# Patient Record
Sex: FEMALE | Race: WHITE | NOT HISPANIC OR LATINO | Employment: FULL TIME | URBAN - METROPOLITAN AREA
[De-identification: names, ages, dates, MRNs, and addresses within clinical notes are randomized per-mention and may not be internally consistent; named-entity substitution may affect disease eponyms.]

---

## 2017-01-06 ENCOUNTER — TELEPHONE (OUTPATIENT)
Dept: OBSTETRICS AND GYNECOLOGY | Facility: CLINIC | Age: 27
End: 2017-01-06

## 2017-01-06 NOTE — TELEPHONE ENCOUNTER
----- Message from Blane Hamlin sent at 1/6/2017  2:11 PM CST -----  Contact: Patient  x_ 1st Request  _ 2nd Request  _ 3rd Request    Who: NENA TORRES [[id number]]    Why: Patient is returning a call to staff. Patient is needing a call back.    What Number to Call Back: Patient can be reached at [phone].    When to Expect a call back: (Before the end of the day)  -- if call after 3:00 call back will be tomorrow.

## 2017-01-06 NOTE — TELEPHONE ENCOUNTER
----- Message from Karey Crespo MA sent at 1/6/2017 12:19 PM CST -----  Contact: self      ----- Message -----     From: Lindy Echevarria     Sent: 1/6/2017   9:59 AM       To: Leah GONZALEZ Staff    Patient would like a call back to discuss possibly changing her birth control. Patient states she is still having break through bleeding since august. Patient would like the stay with oral contraceptives. Patient can be reached at [phone].

## 2017-06-01 ENCOUNTER — TELEPHONE (OUTPATIENT)
Dept: OBSTETRICS AND GYNECOLOGY | Facility: CLINIC | Age: 27
End: 2017-06-01

## 2017-06-01 NOTE — TELEPHONE ENCOUNTER
----- Message from Kenia Benitez sent at 6/1/2017  3:08 PM CDT -----  Contact: NENA TORRES [[id number]]  x_  1st Request  _  2nd Request  _  3rd Request        Who: NENA TORRES [[id number]]    Why: Patient states she would like a call in regards to switching her birth control brands    What Number to Call Back: [phone]    When to Expect a call back: (Before the end of the day)   -- if call after 3:00 call back will be tomorrow.

## 2017-06-01 NOTE — TELEPHONE ENCOUNTER
Patient complains of BTB on contraception-change pills prior and is still experiencing light spotting. Scheduled appointment for evaluation.

## 2017-07-19 RX ORDER — NORGESTREL AND ETHINYL ESTRADIOL 0.3-0.03MG
KIT ORAL
Qty: 28 TABLET | Refills: 1 | Status: SHIPPED | OUTPATIENT
Start: 2017-07-19 | End: 2017-08-16 | Stop reason: SDUPTHER

## 2017-08-16 DIAGNOSIS — Z30.8 ENCOUNTER FOR OTHER CONTRACEPTIVE MANAGEMENT: Primary | ICD-10-CM

## 2017-08-16 NOTE — TELEPHONE ENCOUNTER
Patient is scheduled for annual exam 09/18/2017- requesting additional birth control refill. Last filled 07/19/2017

## 2017-09-18 ENCOUNTER — OFFICE VISIT (OUTPATIENT)
Dept: OBSTETRICS AND GYNECOLOGY | Facility: CLINIC | Age: 27
End: 2017-09-18
Attending: OBSTETRICS & GYNECOLOGY
Payer: COMMERCIAL

## 2017-09-18 ENCOUNTER — TELEPHONE (OUTPATIENT)
Dept: OBSTETRICS AND GYNECOLOGY | Facility: CLINIC | Age: 27
End: 2017-09-18

## 2017-09-18 VITALS
SYSTOLIC BLOOD PRESSURE: 124 MMHG | DIASTOLIC BLOOD PRESSURE: 82 MMHG | BODY MASS INDEX: 28.75 KG/M2 | WEIGHT: 162.25 LBS | HEIGHT: 63 IN

## 2017-09-18 DIAGNOSIS — Z30.8 ENCOUNTER FOR OTHER CONTRACEPTIVE MANAGEMENT: ICD-10-CM

## 2017-09-18 DIAGNOSIS — N92.1 BREAKTHROUGH BLEEDING ON BIRTH CONTROL PILLS: ICD-10-CM

## 2017-09-18 DIAGNOSIS — Z01.419 WELL FEMALE EXAM WITH ROUTINE GYNECOLOGICAL EXAM: Primary | ICD-10-CM

## 2017-09-18 PROCEDURE — 99395 PREV VISIT EST AGE 18-39: CPT | Mod: S$GLB,,, | Performed by: OBSTETRICS & GYNECOLOGY

## 2017-09-18 PROCEDURE — 99999 PR PBB SHADOW E&M-EST. PATIENT-LVL III: CPT | Mod: PBBFAC,,, | Performed by: OBSTETRICS & GYNECOLOGY

## 2017-09-18 NOTE — PROGRESS NOTES
"SUBJECTIVE:   27 y.o. female   for routine gyn exam. Patient's last menstrual period was 2017 (exact date)..  She has had BTB on OCp's.  Switched from Nor BE to Trisprintec .  Then had BTB.  Switched to Lo-Ovral .  Continues with BTB.         History reviewed. No pertinent past medical history.  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     Social History Main Topics    Smoking status: Never Smoker    Smokeless tobacco: Never Used    Alcohol use Yes      Comment: occasional/pre pregnancy    Drug use: No    Sexual activity: Yes     Partners: Male     Birth control/ protection: OCP     Other Topics Concern    Not on file     Social History Narrative    No narrative on file     Family History   Problem Relation Age of Onset    Hyperlipidemia Father     Hypertension Father     Arthritis Paternal Grandmother     Asthma Paternal Grandmother     Hypertension Paternal Grandmother     Cancer Paternal Grandfather     Heart disease Paternal Grandfather     No Known Problems Mother     Cancer Maternal Grandmother     Breast cancer Neg Hx     Colon cancer Neg Hx     Eclampsia Neg Hx     Miscarriages / Stillbirths Neg Hx     Diabetes Neg Hx     Ovarian cancer Neg Hx      labor Neg Hx     Stroke Neg Hx      OB History    Para Term  AB Living   1 1 1     1   SAB TAB Ectopic Multiple Live Births         0        # Outcome Date GA Lbr Charles/2nd Weight Sex Delivery Anes PTL Lv   1 Term 16 38w4d / 01:57 2.91 kg (6 lb 6.7 oz) F Vag-Spont EPI N             Current Outpatient Prescriptions   Medication Sig Dispense Refill    ASCORBATE CALCIUM (VITAMIN C ORAL) Take by " "mouth once daily.      norethindrone-ethinyl estradiol (OVCON) 0.4-35 mg-mcg per tablet Take 1 tablet by mouth once daily. 28 tablet 11     No current facility-administered medications for this visit.      Allergies: Review of patient's allergies indicates no known allergies.     ROS:  Constitutional: no weight loss, weight gain, fever, fatigue  Eyes:  No vision changes, glasses/contacts  ENT/Mouth: No ulcers, sinus problems, ears ringing, headache  Cardiovascular: No inability to lie flat, chest pain, exercise intolerance, swelling, heart palpitations  Respiratory: No wheezing, coughing blood, shortness of breath, or cough  Gastrointestinal: No diarrhea, bloody stool, nausea/vomiting, constipation, gas, hemorrhoids  Genitourinary: No blood in urine, painful urination, urgency of urination, frequency of urination, incomplete emptying, incontinence, abnormal bleeding, painful periods, heavy periods, vaginal discharge, vaginal odor, painful intercourse, sexual problems, bleeding after intercourse.  Musculoskeletal: No muscle weakness  Skin/Breast: No painful breasts, nipple discharge, masses, rash, ulcers  Neurological: No passing out, seizures, numbness, headache  Endocrine: No diabetes, hypothyroid, hyperthyroid, hot flashes, hair loss, abnormal hair growth, ance  Psychiatric: No depression, crying  Hematologic: No bruises, bleeding, swollen lymph nodes, anemia.      OBJECTIVE:   The patient appears well, alert, oriented x 3, in no distress.  /82 (BP Method: Small (Manual))   Ht 5' 3" (1.6 m)   Wt 73.6 kg (162 lb 4.1 oz)   LMP 09/13/2017 (Exact Date)   Breastfeeding? No   BMI 28.74 kg/m²   NECK: no thyromegaly, trachea midline  SKIN: no acne, striae, hirsutism  CHEST: CTAB  CV: RRR  BREAST EXAM: breasts appear normal, no suspicious masses, no skin or nipple changes or axillary nodes  ABDOMEN: no hernias, masses, or hepatosplenomegaly  GENITALIA: normal external genitalia, no erythema, no " discharge  URETHRA: normal urethra, normal urethral meatus  VAGINA: Normal  CERVIX: no lesions or cervical motion tenderness  UTERUS: normal size, contour, position, consistency, mobility, non-tender  ADNEXA: no mass, fullness, tenderness      ASSESSMENT:   1. Well female exam with routine gynecological exam     2. Encounter for other contraceptive management     3. Breakthrough bleeding on birth control pills         PLAN:   Orders Placed This Encounter    norethindrone-ethinyl estradiol (OVCON) 0.4-35 mg-mcg per tablet     Discussed BTB and mgt options. Trial of Ovcon.  Consider u/s, stopping OCP's.  Return to clinic in 1 year

## 2017-09-18 NOTE — TELEPHONE ENCOUNTER
Patient is having a heavy menstrual cycle today-she asked if she should keep scheduled appointment. I advise if she has questions and concerns she should keep appointment.

## 2017-09-18 NOTE — TELEPHONE ENCOUNTER
----- Message from Romero Miller sent at 9/18/2017  8:15 AM CDT -----  Please call pt she has a appt @ 10:45 she has a question [phone]

## 2018-05-21 ENCOUNTER — PATIENT MESSAGE (OUTPATIENT)
Dept: OBSTETRICS AND GYNECOLOGY | Facility: CLINIC | Age: 28
End: 2018-05-21

## 2018-06-11 ENCOUNTER — OFFICE VISIT (OUTPATIENT)
Dept: OBSTETRICS AND GYNECOLOGY | Facility: CLINIC | Age: 28
End: 2018-06-11
Attending: OBSTETRICS & GYNECOLOGY
Payer: COMMERCIAL

## 2018-06-11 ENCOUNTER — LAB VISIT (OUTPATIENT)
Dept: LAB | Facility: OTHER | Age: 28
End: 2018-06-11
Attending: OBSTETRICS & GYNECOLOGY
Payer: COMMERCIAL

## 2018-06-11 VITALS
DIASTOLIC BLOOD PRESSURE: 86 MMHG | WEIGHT: 156.31 LBS | HEIGHT: 63 IN | SYSTOLIC BLOOD PRESSURE: 132 MMHG | BODY MASS INDEX: 27.7 KG/M2

## 2018-06-11 DIAGNOSIS — N93.0 POSTCOITAL BLEEDING: ICD-10-CM

## 2018-06-11 DIAGNOSIS — N92.6 MENSTRUAL DISORDER: ICD-10-CM

## 2018-06-11 DIAGNOSIS — N92.6 MENSTRUAL DISORDER: Primary | ICD-10-CM

## 2018-06-11 LAB
CANDIDA RRNA VAG QL PROBE: POSITIVE
G VAGINALIS RRNA GENITAL QL PROBE: NEGATIVE
T VAGINALIS RRNA GENITAL QL PROBE: NEGATIVE
T4 FREE SERPL-MCNC: 1.08 NG/DL
TSH SERPL DL<=0.005 MIU/L-ACNC: 1.65 UIU/ML

## 2018-06-11 PROCEDURE — 87510 GARDNER VAG DNA DIR PROBE: CPT

## 2018-06-11 PROCEDURE — 99999 PR PBB SHADOW E&M-EST. PATIENT-LVL III: CPT | Mod: PBBFAC,,, | Performed by: OBSTETRICS & GYNECOLOGY

## 2018-06-11 PROCEDURE — 87480 CANDIDA DNA DIR PROBE: CPT

## 2018-06-11 PROCEDURE — 84439 ASSAY OF FREE THYROXINE: CPT

## 2018-06-11 PROCEDURE — 36415 COLL VENOUS BLD VENIPUNCTURE: CPT

## 2018-06-11 PROCEDURE — 99213 OFFICE O/P EST LOW 20 MIN: CPT | Mod: S$GLB,,, | Performed by: OBSTETRICS & GYNECOLOGY

## 2018-06-11 PROCEDURE — 84443 ASSAY THYROID STIM HORMONE: CPT

## 2018-06-11 PROCEDURE — 87491 CHLMYD TRACH DNA AMP PROBE: CPT

## 2018-06-11 NOTE — PROGRESS NOTES
"Larissa iPerre is a 28 y.o. female patient  NEW TO ME (patient of Dr. Lynn), who presents today WITH continuous spotting as well as postcoital bleeding since 2016. She  and was on the progesterone only  Pill and had normal cycles while nursing, and was switched to the combination pills around 2016 after weaning. She began to have midcycle and irregular spotting and was switched from several different pills with no improvement. She has stopped the pills at the end of APril in order to try for pregnancy but has continued to have irregular spotting since that time. She also occasionally has had bleeding after sex.     Patient's last menstrual period was 2018.    History reviewed. No pertinent past medical history.  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     Social History Main Topics    Smoking status: Never Smoker    Smokeless tobacco: Never Used    Alcohol use Yes      Comment: occasional/pre pregnancy    Drug use: No    Sexual activity: Yes     Partners: Male     Birth control/ protection: None      Comment:      Other Topics Concern    Not on file     Social History Narrative    No narrative on file     Family History   Problem Relation Age of Onset    Hyperlipidemia Father     Hypertension Father     Arthritis Paternal Grandmother     Asthma Paternal Grandmother     Hypertension Paternal Grandmother     Cancer Paternal Grandfather     Heart disease Paternal Grandfather     No Known Problems Mother     Cancer Maternal Grandmother     Breast cancer Neg Hx     Colon cancer Neg Hx     Eclampsia Neg Hx     Miscarriages / Stillbirths Neg Hx     " "Diabetes Neg Hx     Ovarian cancer Neg Hx      labor Neg Hx     Stroke Neg Hx      OB History      Para Term  AB Living    1 1 1     1    SAB TAB Ectopic Multiple Live Births          0                  ROS:  GENERAL: Feeling well overall.   SKIN: Denies rash or lesions.   HEAD: Denies head injury or headache.   NODES: Denies enlarged lymph nodes.   CHEST: Denies chest pain or shortness of breath.   CARDIOVASCULAR: Denies palpitations or left sided chest pain.   ABDOMEN: No abdominal pain, nausea, vomiting or rectal bleeding.   URINARY: No dysuria, hematuria, or burning on urination.  REPRODUCTIVE: See HPI.   BREASTS: Denies pain, lumps, or nipple discharge.   HEMATOLOGIC: No easy bruisability or excessive bleeding.   MUSCULOSKELETAL: Denies joint pain or swelling.   NEUROLOGIC: Denies syncope or weakness.   PSYCHIATRIC: Denies depression.    /86   Ht 5' 3" (1.6 m)   Wt 70.9 kg (156 lb 4.9 oz)   LMP 2018   BMI 27.69 kg/m²     PE:   APPEARANCE: Well nourished, well developed, in no acute distress.  ABDOMEN: Soft. No tenderness or masses. No hernias. No CVA tenderness.  VULVA: No lesions. Normal female genitalia.  URETHRAL MEATUS: Normal size and location, no lesions, no prolapse.  URETHRA: No masses, tenderness, prolapse or scarring.  VAGINA: Moist and well rugated, no discharge, no significant cystocele or rectocele.  CERVIX: No lesions and discharge. FRIABLE, with ECTROPION.   UTERUS: Normal size, regular shape, mobile, non-tender, bladder base nontender.  ADNEXA: No masses, tenderness or CDS nodularity.  ANUS PERINEUM: Normal.    PROCEDURES:    1. Menstrual disorder  US Pelvis Comp with Transvag NON-OB (xpd    TSH    T4, free   2. Postcoital bleeding  C. trachomatis/N. gonorrhoeae by AMP DNA Cervix    Vaginosis Screen by DNA Probe    AND PLAN:  Discussed evaluation for irregular bleeding, including the possible need for hysteroscopy. Face to face time with this patient was " 20 minutes of which more than 50% was spent in counseling.    Follow-up if symptoms worsen or fail to improve.

## 2018-06-12 ENCOUNTER — HOSPITAL ENCOUNTER (OUTPATIENT)
Dept: RADIOLOGY | Facility: OTHER | Age: 28
Discharge: HOME | End: 2018-06-12
Attending: OBSTETRICS & GYNECOLOGY
Payer: COMMERCIAL

## 2018-06-12 DIAGNOSIS — N92.6 MENSTRUAL DISORDER: ICD-10-CM

## 2018-06-12 DIAGNOSIS — B37.9 MONILIA INFECTION: Primary | ICD-10-CM

## 2018-06-12 LAB
C TRACH DNA SPEC QL NAA+PROBE: NOT DETECTED
N GONORRHOEA DNA SPEC QL NAA+PROBE: NOT DETECTED

## 2018-06-12 PROCEDURE — 76856 US EXAM PELVIC COMPLETE: CPT | Mod: 26,,, | Performed by: RADIOLOGY

## 2018-06-12 PROCEDURE — 76830 TRANSVAGINAL US NON-OB: CPT | Mod: 26,,, | Performed by: RADIOLOGY

## 2018-06-12 PROCEDURE — 76830 TRANSVAGINAL US NON-OB: CPT | Mod: TC

## 2018-06-12 RX ORDER — FLUCONAZOLE 150 MG/1
150 TABLET ORAL ONCE
Qty: 1 TABLET | Refills: 0 | Status: SHIPPED | OUTPATIENT
Start: 2018-06-12 | End: 2018-06-12

## 2018-06-13 ENCOUNTER — TELEPHONE (OUTPATIENT)
Dept: OBSTETRICS AND GYNECOLOGY | Facility: CLINIC | Age: 28
End: 2018-06-13

## 2018-06-13 ENCOUNTER — PATIENT MESSAGE (OUTPATIENT)
Dept: OBSTETRICS AND GYNECOLOGY | Facility: CLINIC | Age: 28
End: 2018-06-13

## 2018-06-13 NOTE — TELEPHONE ENCOUNTER
Pt want to talk about options for bleeding. Advised will have Dr Meléndez call her once she is done clinic

## 2018-06-13 NOTE — TELEPHONE ENCOUNTER
----- Message from Emeli Diaz sent at 6/13/2018  8:35 AM CDT -----  Contact: self  Pt returning a missed call from yesterday, she can be reached at [phone].

## 2018-06-14 DIAGNOSIS — N92.6 MENSTRUAL DISORDER: Primary | ICD-10-CM

## 2018-06-14 NOTE — TELEPHONE ENCOUNTER
"Discussed with patient that she is probably not ovulating, but will check labs for further verification. Will consider cycling with Prometrium to "reset" cycle.   "

## 2018-06-18 ENCOUNTER — TELEPHONE (OUTPATIENT)
Dept: OBSTETRICS AND GYNECOLOGY | Facility: CLINIC | Age: 28
End: 2018-06-18

## 2018-06-18 ENCOUNTER — LAB VISIT (OUTPATIENT)
Dept: LAB | Facility: HOSPITAL | Age: 28
End: 2018-06-18
Attending: OBSTETRICS & GYNECOLOGY
Payer: COMMERCIAL

## 2018-06-18 ENCOUNTER — PATIENT MESSAGE (OUTPATIENT)
Dept: OBSTETRICS AND GYNECOLOGY | Facility: CLINIC | Age: 28
End: 2018-06-18

## 2018-06-18 DIAGNOSIS — N92.6 MENSTRUAL DISORDER: ICD-10-CM

## 2018-06-18 DIAGNOSIS — N91.2 AMENORRHEA: ICD-10-CM

## 2018-06-18 DIAGNOSIS — N91.2 AMENORRHEA: Primary | ICD-10-CM

## 2018-06-18 LAB — HCG INTACT+B SERPL-ACNC: 595 MIU/ML

## 2018-06-18 PROCEDURE — 82670 ASSAY OF TOTAL ESTRADIOL: CPT

## 2018-06-18 PROCEDURE — 36415 COLL VENOUS BLD VENIPUNCTURE: CPT

## 2018-06-18 PROCEDURE — 84144 ASSAY OF PROGESTERONE: CPT

## 2018-06-18 PROCEDURE — 83001 ASSAY OF GONADOTROPIN (FSH): CPT

## 2018-06-18 PROCEDURE — 84702 CHORIONIC GONADOTROPIN TEST: CPT

## 2018-06-18 PROCEDURE — 84146 ASSAY OF PROLACTIN: CPT

## 2018-06-19 ENCOUNTER — PATIENT MESSAGE (OUTPATIENT)
Dept: OBSTETRICS AND GYNECOLOGY | Facility: CLINIC | Age: 28
End: 2018-06-19

## 2018-06-19 DIAGNOSIS — Z32.01 POSITIVE PREGNANCY TEST: Primary | ICD-10-CM

## 2018-06-19 LAB
ESTRADIOL SERPL-MCNC: 343 PG/ML
FSH SERPL-ACNC: 0.1 MIU/ML
PROGEST SERPL-MCNC: 27.3 NG/ML
PROLACTIN SERPL IA-MCNC: 12.3 NG/ML

## 2018-06-20 ENCOUNTER — LAB VISIT (OUTPATIENT)
Dept: LAB | Facility: HOSPITAL | Age: 28
End: 2018-06-20
Attending: OBSTETRICS & GYNECOLOGY
Payer: COMMERCIAL

## 2018-06-20 DIAGNOSIS — Z32.01 POSITIVE PREGNANCY TEST: ICD-10-CM

## 2018-06-20 LAB
ABO + RH BLD: NORMAL
BASOPHILS # BLD AUTO: 0.01 K/UL
BASOPHILS NFR BLD: 0.2 %
BLD GP AB SCN CELLS X3 SERPL QL: NORMAL
DIFFERENTIAL METHOD: NORMAL
EOSINOPHIL # BLD AUTO: 0 K/UL
EOSINOPHIL NFR BLD: 0.9 %
ERYTHROCYTE [DISTWIDTH] IN BLOOD BY AUTOMATED COUNT: 13.2 %
HCG INTACT+B SERPL-ACNC: 1206 MIU/ML
HCT VFR BLD AUTO: 38.9 %
HGB BLD-MCNC: 13.5 G/DL
LYMPHOCYTES # BLD AUTO: 1.2 K/UL
LYMPHOCYTES NFR BLD: 26.3 %
MCH RBC QN AUTO: 29.9 PG
MCHC RBC AUTO-ENTMCNC: 34.7 G/DL
MCV RBC AUTO: 86 FL
MONOCYTES # BLD AUTO: 0.4 K/UL
MONOCYTES NFR BLD: 7.9 %
NEUTROPHILS # BLD AUTO: 3 K/UL
NEUTROPHILS NFR BLD: 64.3 %
PLATELET # BLD AUTO: 229 K/UL
PMV BLD AUTO: 10.1 FL
PROGEST SERPL-MCNC: 21.9 NG/ML
RBC # BLD AUTO: 4.52 M/UL
WBC # BLD AUTO: 4.68 K/UL

## 2018-06-20 PROCEDURE — 87340 HEPATITIS B SURFACE AG IA: CPT

## 2018-06-20 PROCEDURE — 86592 SYPHILIS TEST NON-TREP QUAL: CPT

## 2018-06-20 PROCEDURE — 86850 RBC ANTIBODY SCREEN: CPT

## 2018-06-20 PROCEDURE — 84144 ASSAY OF PROGESTERONE: CPT

## 2018-06-20 PROCEDURE — 86703 HIV-1/HIV-2 1 RESULT ANTBDY: CPT

## 2018-06-20 PROCEDURE — 86762 RUBELLA ANTIBODY: CPT

## 2018-06-20 PROCEDURE — 84702 CHORIONIC GONADOTROPIN TEST: CPT

## 2018-06-20 PROCEDURE — 36415 COLL VENOUS BLD VENIPUNCTURE: CPT

## 2018-06-20 PROCEDURE — 85025 COMPLETE CBC W/AUTO DIFF WBC: CPT

## 2018-06-21 LAB
HBV SURFACE AG SERPL QL IA: NEGATIVE
HIV 1+2 AB+HIV1 P24 AG SERPL QL IA: NEGATIVE
RPR SER QL: NORMAL
RUBV IGG SER-ACNC: 11.1 IU/ML
RUBV IGG SER-IMP: REACTIVE

## 2018-07-10 ENCOUNTER — PROCEDURE VISIT (OUTPATIENT)
Dept: OBSTETRICS AND GYNECOLOGY | Facility: CLINIC | Age: 28
End: 2018-07-10
Attending: OBSTETRICS & GYNECOLOGY
Payer: COMMERCIAL

## 2018-07-10 DIAGNOSIS — N92.6 MISSED MENSES: ICD-10-CM

## 2018-07-10 DIAGNOSIS — Z36.89 ESTABLISH GESTATIONAL AGE, ULTRASOUND: ICD-10-CM

## 2018-07-10 DIAGNOSIS — O36.80X0 ENCOUNTER TO DETERMINE FETAL VIABILITY OF PREGNANCY, SINGLE OR UNSPECIFIED FETUS: ICD-10-CM

## 2018-07-10 DIAGNOSIS — Z32.01 POSITIVE PREGNANCY TEST: ICD-10-CM

## 2018-07-10 PROCEDURE — 76801 OB US < 14 WKS SINGLE FETUS: CPT | Mod: S$GLB,,, | Performed by: OBSTETRICS & GYNECOLOGY

## 2018-07-10 NOTE — PROCEDURES
Procedures   Obstetrical ultrasound completed today.  See report in imaging section of [facility].

## 2018-07-18 ENCOUNTER — TELEPHONE (OUTPATIENT)
Dept: OBSTETRICS AND GYNECOLOGY | Facility: CLINIC | Age: 28
End: 2018-07-18

## 2018-07-18 NOTE — TELEPHONE ENCOUNTER
----- Message from Trisha Andre sent at 7/18/2018  9:17 AM CDT -----  Contact: pt            Name of Who is Calling:NENA TORRES [[id number]]    What is the request in detail: pt is 9 weeks and would like to transfer care from Dr Meléndez. Pt has had first ultra sound and blood work.. Please advise      Can the clinic reply by [facility]: no      [facility]: [phone]

## 2018-07-19 ENCOUNTER — TELEPHONE (OUTPATIENT)
Dept: OBSTETRICS AND GYNECOLOGY | Facility: CLINIC | Age: 28
End: 2018-07-19

## 2018-07-19 NOTE — TELEPHONE ENCOUNTER
----- Message from Kenia Nino sent at 7/19/2018  9:17 AM CDT -----  Contact: NENA TORRES [[id number]]  Name of Who is Calling:NENA TORRES [[id number]]     What is the request in detail: Returning a call.  pt is 9 weeks and would like to transfer care from Dr Meléndez. Pt has had first ultra sound and blood work.. Please advise        Can the clinic reply by [facility]: no        What [facility]: [phone]

## 2018-07-23 ENCOUNTER — TELEPHONE (OUTPATIENT)
Dept: OBSTETRICS AND GYNECOLOGY | Facility: CLINIC | Age: 28
End: 2018-07-23

## 2018-07-23 DIAGNOSIS — O21.9 NAUSEA/VOMITING IN PREGNANCY: Primary | ICD-10-CM

## 2018-07-23 RX ORDER — ONDANSETRON 8 MG/1
8 TABLET, ORALLY DISINTEGRATING ORAL EVERY 8 HOURS PRN
Qty: 30 TABLET | Refills: 0 | Status: SHIPPED | OUTPATIENT
Start: 2018-07-23 | End: 2019-02-07 | Stop reason: SDUPTHER

## 2018-07-23 NOTE — TELEPHONE ENCOUNTER
----- Message from Lindy Echevarria sent at 7/23/2018  8:36 AM CDT -----  Contact: self  Patient is requesting a Rx for nausea. Patient uses [facility]. Patient can be reached at [phone].

## 2018-07-24 ENCOUNTER — PATIENT MESSAGE (OUTPATIENT)
Dept: OBSTETRICS AND GYNECOLOGY | Facility: CLINIC | Age: 28
End: 2018-07-24

## 2018-08-14 ENCOUNTER — PATIENT OUTREACH (OUTPATIENT)
Dept: OTHER | Facility: OTHER | Age: 28
End: 2018-08-14

## 2018-08-14 ENCOUNTER — PATIENT MESSAGE (OUTPATIENT)
Dept: ADMINISTRATIVE | Facility: OTHER | Age: 28
End: 2018-08-14

## 2018-08-14 ENCOUNTER — ROUTINE PRENATAL (OUTPATIENT)
Dept: OBSTETRICS AND GYNECOLOGY | Facility: CLINIC | Age: 28
End: 2018-08-14
Payer: COMMERCIAL

## 2018-08-14 VITALS
DIASTOLIC BLOOD PRESSURE: 70 MMHG | BODY MASS INDEX: 29.09 KG/M2 | WEIGHT: 164.25 LBS | SYSTOLIC BLOOD PRESSURE: 122 MMHG

## 2018-08-14 DIAGNOSIS — Z34.80 SUPERVISION OF OTHER NORMAL PREGNANCY, ANTEPARTUM: Primary | ICD-10-CM

## 2018-08-14 PROCEDURE — 0500F INITIAL PRENATAL CARE VISIT: CPT | Mod: S$GLB,,, | Performed by: OBSTETRICS & GYNECOLOGY

## 2018-08-14 PROCEDURE — 99999 PR PBB SHADOW E&M-EST. PATIENT-LVL II: CPT | Mod: PBBFAC,,, | Performed by: OBSTETRICS & GYNECOLOGY

## 2018-08-17 NOTE — TELEPHONE ENCOUNTER
Initial introduction with Ms. Larissa White Ignacio completed and the role of the health coaches for [facility] was explained via [employer].    Reviewed the importance of taking weights and blood pressure readings, using the health  as a resource for physical activity and dietary habits, and that [facility] messages will be sent for weeks 20, 30, and 37 home urine tests.  Reviewed that the patient should contact her OB team with any specific questions regarding her pregnancy, and to contact [facility] or 911 in the case of a medical emergency.

## 2018-09-09 ENCOUNTER — PATIENT MESSAGE (OUTPATIENT)
Dept: OBSTETRICS AND GYNECOLOGY | Facility: CLINIC | Age: 28
End: 2018-09-09

## 2018-09-13 ENCOUNTER — ROUTINE PRENATAL (OUTPATIENT)
Dept: OBSTETRICS AND GYNECOLOGY | Facility: CLINIC | Age: 28
End: 2018-09-13
Payer: COMMERCIAL

## 2018-09-13 VITALS
BODY MASS INDEX: 29.84 KG/M2 | WEIGHT: 168.44 LBS | SYSTOLIC BLOOD PRESSURE: 108 MMHG | DIASTOLIC BLOOD PRESSURE: 64 MMHG

## 2018-09-13 DIAGNOSIS — Z34.80 SUPERVISION OF OTHER NORMAL PREGNANCY, ANTEPARTUM: Primary | ICD-10-CM

## 2018-09-13 PROCEDURE — 99999 PR PBB SHADOW E&M-EST. PATIENT-LVL III: CPT | Mod: PBBFAC,,, | Performed by: OBSTETRICS & GYNECOLOGY

## 2018-09-13 PROCEDURE — 0502F SUBSEQUENT PRENATAL CARE: CPT | Mod: S$GLB,,, | Performed by: OBSTETRICS & GYNECOLOGY

## 2018-09-13 RX ORDER — AZELAIC ACID 0.15 G/G
AEROSOL, FOAM TOPICAL
Status: ON HOLD | COMMUNITY
Start: 2018-09-10 | End: 2022-10-24 | Stop reason: HOSPADM

## 2018-09-13 RX ORDER — CLINDAMYCIN 1 G/10ML
GEL TOPICAL
COMMUNITY
Start: 2018-09-10 | End: 2022-10-20 | Stop reason: CLARIF

## 2018-09-13 NOTE — PROGRESS NOTES
Pt doing well. No vaginal bleeding, no loss of fluid, positive fetal movement, no contractions. Bleeding/labor/rom/fmc precautions.     [facility]. RTC 8 weeks. DM screen, CBC next visit. Flu shot on 10/1/18 with anatomy.

## 2018-10-01 ENCOUNTER — CLINICAL SUPPORT (OUTPATIENT)
Dept: OBSTETRICS AND GYNECOLOGY | Facility: CLINIC | Age: 28
End: 2018-10-01
Payer: COMMERCIAL

## 2018-10-01 ENCOUNTER — PROCEDURE VISIT (OUTPATIENT)
Dept: MATERNAL FETAL MEDICINE | Facility: CLINIC | Age: 28
End: 2018-10-01
Payer: COMMERCIAL

## 2018-10-01 DIAGNOSIS — Z34.80 SUPERVISION OF OTHER NORMAL PREGNANCY, ANTEPARTUM: ICD-10-CM

## 2018-10-01 DIAGNOSIS — Z36.89 ENCOUNTER FOR FETAL ANATOMIC SURVEY: ICD-10-CM

## 2018-10-01 PROCEDURE — 90686 IIV4 VACC NO PRSV 0.5 ML IM: CPT | Mod: S$GLB,,, | Performed by: OBSTETRICS & GYNECOLOGY

## 2018-10-01 PROCEDURE — 99999 PR PBB SHADOW E&M-EST. PATIENT-LVL II: CPT | Mod: PBBFAC,,,

## 2018-10-01 PROCEDURE — 76805 OB US >/= 14 WKS SNGL FETUS: CPT | Mod: S$GLB,,, | Performed by: OBSTETRICS & GYNECOLOGY

## 2018-10-01 PROCEDURE — 99499 UNLISTED E&M SERVICE: CPT | Mod: S$GLB,,, | Performed by: OBSTETRICS & GYNECOLOGY

## 2018-10-01 PROCEDURE — 90471 IMMUNIZATION ADMIN: CPT | Mod: S$GLB,,, | Performed by: OBSTETRICS & GYNECOLOGY

## 2018-10-01 NOTE — PROGRESS NOTES
Obstetrical ultrasound completed today.  See report in imaging section of [facility].

## 2018-10-10 ENCOUNTER — PATIENT OUTREACH (OUTPATIENT)
Dept: OTHER | Facility: OTHER | Age: 28
End: 2018-10-10

## 2018-10-10 NOTE — TELEPHONE ENCOUNTER
Reminded patient that week 20 home urine test is due for [employer] via [employer]. Asked that she check [facility] messages for instructions and link to submit results.

## 2018-10-11 ENCOUNTER — PATIENT MESSAGE (OUTPATIENT)
Dept: ADMINISTRATIVE | Facility: OTHER | Age: 28
End: 2018-10-11

## 2018-11-08 ENCOUNTER — LAB VISIT (OUTPATIENT)
Dept: LAB | Facility: OTHER | Age: 28
End: 2018-11-08
Attending: OBSTETRICS & GYNECOLOGY
Payer: COMMERCIAL

## 2018-11-08 ENCOUNTER — ROUTINE PRENATAL (OUTPATIENT)
Dept: OBSTETRICS AND GYNECOLOGY | Facility: CLINIC | Age: 28
End: 2018-11-08
Payer: COMMERCIAL

## 2018-11-08 VITALS
WEIGHT: 179.88 LBS | DIASTOLIC BLOOD PRESSURE: 82 MMHG | BODY MASS INDEX: 31.87 KG/M2 | SYSTOLIC BLOOD PRESSURE: 114 MMHG

## 2018-11-08 DIAGNOSIS — Z34.80 SUPERVISION OF OTHER NORMAL PREGNANCY, ANTEPARTUM: ICD-10-CM

## 2018-11-08 DIAGNOSIS — Z34.80 SUPERVISION OF OTHER NORMAL PREGNANCY, ANTEPARTUM: Primary | ICD-10-CM

## 2018-11-08 LAB
BASOPHILS # BLD AUTO: 0.01 K/UL
BASOPHILS NFR BLD: 0.2 %
DIFFERENTIAL METHOD: ABNORMAL
EOSINOPHIL # BLD AUTO: 0.1 K/UL
EOSINOPHIL NFR BLD: 0.8 %
ERYTHROCYTE [DISTWIDTH] IN BLOOD BY AUTOMATED COUNT: 12.8 %
GLUCOSE SERPL-MCNC: 80 MG/DL
HCT VFR BLD AUTO: 35.9 %
HGB BLD-MCNC: 11.9 G/DL
LYMPHOCYTES # BLD AUTO: 1.5 K/UL
LYMPHOCYTES NFR BLD: 23.9 %
MCH RBC QN AUTO: 30.2 PG
MCHC RBC AUTO-ENTMCNC: 33.1 G/DL
MCV RBC AUTO: 91 FL
MONOCYTES # BLD AUTO: 0.4 K/UL
MONOCYTES NFR BLD: 7.2 %
NEUTROPHILS # BLD AUTO: 4.1 K/UL
NEUTROPHILS NFR BLD: 67.2 %
PLATELET # BLD AUTO: 221 K/UL
PMV BLD AUTO: 10.1 FL
RBC # BLD AUTO: 3.94 M/UL
WBC # BLD AUTO: 6.12 K/UL

## 2018-11-08 PROCEDURE — 99999 PR PBB SHADOW E&M-EST. PATIENT-LVL III: CPT | Mod: PBBFAC,,, | Performed by: OBSTETRICS & GYNECOLOGY

## 2018-11-08 PROCEDURE — 82950 GLUCOSE TEST: CPT

## 2018-11-08 PROCEDURE — 0502F SUBSEQUENT PRENATAL CARE: CPT | Mod: S$GLB,,, | Performed by: OBSTETRICS & GYNECOLOGY

## 2018-11-08 PROCEDURE — 36415 COLL VENOUS BLD VENIPUNCTURE: CPT

## 2018-11-08 PROCEDURE — 85025 COMPLETE CBC W/AUTO DIFF WBC: CPT

## 2018-11-08 NOTE — PROGRESS NOTES
Pt doing well. No vaginal bleeding, no loss of fluid, positive fetal movement, no contractions. Bleeding/labor/rom/fmc precautions.     DM screen done today. Tdap next visit. RTC 4 weeks.

## 2018-11-14 ENCOUNTER — PATIENT MESSAGE (OUTPATIENT)
Dept: OBSTETRICS AND GYNECOLOGY | Facility: CLINIC | Age: 28
End: 2018-11-14

## 2018-12-06 ENCOUNTER — ROUTINE PRENATAL (OUTPATIENT)
Dept: OBSTETRICS AND GYNECOLOGY | Facility: CLINIC | Age: 28
End: 2018-12-06
Payer: COMMERCIAL

## 2018-12-06 ENCOUNTER — CLINICAL SUPPORT (OUTPATIENT)
Dept: OBSTETRICS AND GYNECOLOGY | Facility: CLINIC | Age: 28
End: 2018-12-06
Payer: COMMERCIAL

## 2018-12-06 VITALS
BODY MASS INDEX: 32.84 KG/M2 | DIASTOLIC BLOOD PRESSURE: 70 MMHG | SYSTOLIC BLOOD PRESSURE: 102 MMHG | WEIGHT: 185.44 LBS

## 2018-12-06 DIAGNOSIS — Z34.80 SUPERVISION OF OTHER NORMAL PREGNANCY, ANTEPARTUM: ICD-10-CM

## 2018-12-06 DIAGNOSIS — Z34.80 SUPERVISION OF OTHER NORMAL PREGNANCY, ANTEPARTUM: Primary | ICD-10-CM

## 2018-12-06 PROCEDURE — 99999 PR PBB SHADOW E&M-EST. PATIENT-LVL II: CPT | Mod: PBBFAC,,,

## 2018-12-06 PROCEDURE — 0502F SUBSEQUENT PRENATAL CARE: CPT | Mod: S$GLB,,, | Performed by: OBSTETRICS & GYNECOLOGY

## 2018-12-06 PROCEDURE — 99999 PR PBB SHADOW E&M-EST. PATIENT-LVL II: CPT | Mod: PBBFAC,,, | Performed by: OBSTETRICS & GYNECOLOGY

## 2018-12-06 PROCEDURE — 90715 TDAP VACCINE 7 YRS/> IM: CPT | Mod: S$GLB,,, | Performed by: OBSTETRICS & GYNECOLOGY

## 2018-12-06 PROCEDURE — 90471 IMMUNIZATION ADMIN: CPT | Mod: S$GLB,,, | Performed by: OBSTETRICS & GYNECOLOGY

## 2018-12-06 NOTE — PROGRESS NOTES
Pt doing well. No vaginal bleeding, no loss of fluid, positive fetal movement, no contractions. Bleeding/labor/rom/fmc precautions.     Tdap today. Next visit [facility]. RTC 1/3.

## 2018-12-19 ENCOUNTER — PATIENT MESSAGE (OUTPATIENT)
Dept: OBSTETRICS AND GYNECOLOGY | Facility: CLINIC | Age: 28
End: 2018-12-19

## 2018-12-19 ENCOUNTER — PATIENT OUTREACH (OUTPATIENT)
Dept: OTHER | Facility: OTHER | Age: 28
End: 2018-12-19

## 2019-01-02 ENCOUNTER — PATIENT MESSAGE (OUTPATIENT)
Dept: ADMINISTRATIVE | Facility: OTHER | Age: 29
End: 2019-01-02

## 2019-01-03 ENCOUNTER — ROUTINE PRENATAL (OUTPATIENT)
Dept: OBSTETRICS AND GYNECOLOGY | Facility: CLINIC | Age: 29
End: 2019-01-03
Payer: COMMERCIAL

## 2019-01-03 VITALS
WEIGHT: 190.06 LBS | SYSTOLIC BLOOD PRESSURE: 104 MMHG | DIASTOLIC BLOOD PRESSURE: 70 MMHG | BODY MASS INDEX: 33.66 KG/M2

## 2019-01-03 DIAGNOSIS — Z34.80 SUPERVISION OF OTHER NORMAL PREGNANCY, ANTEPARTUM: Primary | ICD-10-CM

## 2019-01-03 PROCEDURE — 99999 PR PBB SHADOW E&M-EST. PATIENT-LVL III: ICD-10-PCS | Mod: PBBFAC,,, | Performed by: OBSTETRICS & GYNECOLOGY

## 2019-01-03 PROCEDURE — 99999 PR PBB SHADOW E&M-EST. PATIENT-LVL III: CPT | Mod: PBBFAC,,, | Performed by: OBSTETRICS & GYNECOLOGY

## 2019-01-03 PROCEDURE — 0502F SUBSEQUENT PRENATAL CARE: CPT | Mod: S$GLB,,, | Performed by: OBSTETRICS & GYNECOLOGY

## 2019-01-03 PROCEDURE — 0502F PR SUBSEQUENT PRENATAL CARE: ICD-10-PCS | Mod: S$GLB,,, | Performed by: OBSTETRICS & GYNECOLOGY

## 2019-01-03 NOTE — PROGRESS NOTES
Pt doing well. No vaginal bleeding, no loss of fluid, positive fetal movement, no contractions. Bleeding/labor/rom/fmc precautions.     Labs, SPADD next visit. RTC 3 weeks.

## 2019-01-17 ENCOUNTER — ROUTINE PRENATAL (OUTPATIENT)
Dept: OBSTETRICS AND GYNECOLOGY | Facility: CLINIC | Age: 29
End: 2019-01-17
Payer: COMMERCIAL

## 2019-01-17 ENCOUNTER — LAB VISIT (OUTPATIENT)
Dept: LAB | Facility: OTHER | Age: 29
End: 2019-01-17
Attending: OBSTETRICS & GYNECOLOGY
Payer: COMMERCIAL

## 2019-01-17 VITALS — BODY MASS INDEX: 34.6 KG/M2 | DIASTOLIC BLOOD PRESSURE: 62 MMHG | WEIGHT: 195.31 LBS | SYSTOLIC BLOOD PRESSURE: 114 MMHG

## 2019-01-17 DIAGNOSIS — Z34.80 SUPERVISION OF OTHER NORMAL PREGNANCY, ANTEPARTUM: ICD-10-CM

## 2019-01-17 DIAGNOSIS — Z34.80 SUPERVISION OF OTHER NORMAL PREGNANCY, ANTEPARTUM: Primary | ICD-10-CM

## 2019-01-17 LAB
BASOPHILS # BLD AUTO: 0.01 K/UL
BASOPHILS NFR BLD: 0.1 %
DIFFERENTIAL METHOD: NORMAL
EOSINOPHIL # BLD AUTO: 0 K/UL
EOSINOPHIL NFR BLD: 0.3 %
ERYTHROCYTE [DISTWIDTH] IN BLOOD BY AUTOMATED COUNT: 12.5 %
HCT VFR BLD AUTO: 37.5 %
HGB BLD-MCNC: 12.6 G/DL
HIV 1+2 AB+HIV1 P24 AG SERPL QL IA: NEGATIVE
LYMPHOCYTES # BLD AUTO: 1.3 K/UL
LYMPHOCYTES NFR BLD: 19.6 %
MCH RBC QN AUTO: 29.4 PG
MCHC RBC AUTO-ENTMCNC: 33.6 G/DL
MCV RBC AUTO: 88 FL
MONOCYTES # BLD AUTO: 0.6 K/UL
MONOCYTES NFR BLD: 9.3 %
NEUTROPHILS # BLD AUTO: 4.7 K/UL
NEUTROPHILS NFR BLD: 69.7 %
PLATELET # BLD AUTO: 244 K/UL
PMV BLD AUTO: 10.1 FL
RBC # BLD AUTO: 4.28 M/UL
WBC # BLD AUTO: 6.8 K/UL

## 2019-01-17 PROCEDURE — 99999 PR PBB SHADOW E&M-EST. PATIENT-LVL II: CPT | Mod: PBBFAC,,, | Performed by: OBSTETRICS & GYNECOLOGY

## 2019-01-17 PROCEDURE — 36415 COLL VENOUS BLD VENIPUNCTURE: CPT

## 2019-01-17 PROCEDURE — 99999 PR PBB SHADOW E&M-EST. PATIENT-LVL II: ICD-10-PCS | Mod: PBBFAC,,, | Performed by: OBSTETRICS & GYNECOLOGY

## 2019-01-17 PROCEDURE — 85025 COMPLETE CBC W/AUTO DIFF WBC: CPT

## 2019-01-17 PROCEDURE — 86703 HIV-1/HIV-2 1 RESULT ANTBDY: CPT

## 2019-01-17 PROCEDURE — 0502F SUBSEQUENT PRENATAL CARE: CPT | Mod: S$GLB,,, | Performed by: OBSTETRICS & GYNECOLOGY

## 2019-01-17 PROCEDURE — 86592 SYPHILIS TEST NON-TREP QUAL: CPT

## 2019-01-17 PROCEDURE — 0502F PR SUBSEQUENT PRENATAL CARE: ICD-10-PCS | Mod: S$GLB,,, | Performed by: OBSTETRICS & GYNECOLOGY

## 2019-01-18 LAB — RPR SER QL: NORMAL

## 2019-01-31 ENCOUNTER — ROUTINE PRENATAL (OUTPATIENT)
Dept: OBSTETRICS AND GYNECOLOGY | Facility: CLINIC | Age: 29
End: 2019-01-31
Payer: COMMERCIAL

## 2019-01-31 VITALS
BODY MASS INDEX: 34.95 KG/M2 | WEIGHT: 197.31 LBS | DIASTOLIC BLOOD PRESSURE: 74 MMHG | SYSTOLIC BLOOD PRESSURE: 118 MMHG

## 2019-01-31 DIAGNOSIS — Z34.80 SUPERVISION OF OTHER NORMAL PREGNANCY, ANTEPARTUM: Primary | ICD-10-CM

## 2019-01-31 PROCEDURE — 87081 CULTURE SCREEN ONLY: CPT

## 2019-01-31 PROCEDURE — 0502F PR SUBSEQUENT PRENATAL CARE: ICD-10-PCS | Mod: S$GLB,,, | Performed by: OBSTETRICS & GYNECOLOGY

## 2019-01-31 PROCEDURE — 99999 PR PBB SHADOW E&M-EST. PATIENT-LVL III: ICD-10-PCS | Mod: PBBFAC,,, | Performed by: OBSTETRICS & GYNECOLOGY

## 2019-01-31 PROCEDURE — 0502F SUBSEQUENT PRENATAL CARE: CPT | Mod: S$GLB,,, | Performed by: OBSTETRICS & GYNECOLOGY

## 2019-01-31 PROCEDURE — 87147 CULTURE TYPE IMMUNOLOGIC: CPT

## 2019-01-31 PROCEDURE — 99999 PR PBB SHADOW E&M-EST. PATIENT-LVL III: CPT | Mod: PBBFAC,,, | Performed by: OBSTETRICS & GYNECOLOGY

## 2019-01-31 PROCEDURE — 87184 SC STD DISK METHOD PER PLATE: CPT

## 2019-01-31 NOTE — PROGRESS NOTES
Pt doing well. No vaginal bleeding, no loss of fluid, positive fetal movement, no contractions. Bleeding/labor/rom/fmc precautions.     GBS done. Induction scheduled for 2/15/19.

## 2019-02-05 LAB — BACTERIA SPEC AEROBE CULT: NORMAL

## 2019-02-07 ENCOUNTER — ROUTINE PRENATAL (OUTPATIENT)
Dept: OBSTETRICS AND GYNECOLOGY | Facility: CLINIC | Age: 29
End: 2019-02-07
Payer: COMMERCIAL

## 2019-02-07 VITALS
WEIGHT: 192.25 LBS | DIASTOLIC BLOOD PRESSURE: 68 MMHG | SYSTOLIC BLOOD PRESSURE: 112 MMHG | BODY MASS INDEX: 34.05 KG/M2

## 2019-02-07 DIAGNOSIS — Z34.80 SUPERVISION OF OTHER NORMAL PREGNANCY, ANTEPARTUM: Primary | ICD-10-CM

## 2019-02-07 DIAGNOSIS — O21.9 NAUSEA/VOMITING IN PREGNANCY: ICD-10-CM

## 2019-02-07 PROCEDURE — 0502F PR SUBSEQUENT PRENATAL CARE: ICD-10-PCS | Mod: CPTII,S$GLB,, | Performed by: OBSTETRICS & GYNECOLOGY

## 2019-02-07 PROCEDURE — 0502F SUBSEQUENT PRENATAL CARE: CPT | Mod: CPTII,S$GLB,, | Performed by: OBSTETRICS & GYNECOLOGY

## 2019-02-07 PROCEDURE — 99999 PR PBB SHADOW E&M-EST. PATIENT-LVL II: CPT | Mod: PBBFAC,,, | Performed by: OBSTETRICS & GYNECOLOGY

## 2019-02-07 PROCEDURE — 99999 PR PBB SHADOW E&M-EST. PATIENT-LVL II: ICD-10-PCS | Mod: PBBFAC,,, | Performed by: OBSTETRICS & GYNECOLOGY

## 2019-02-07 RX ORDER — ONDANSETRON 4 MG/1
4 TABLET, ORALLY DISINTEGRATING ORAL EVERY 8 HOURS PRN
Qty: 30 TABLET | Refills: 0 | Status: ON HOLD | OUTPATIENT
Start: 2019-02-07 | End: 2019-02-16 | Stop reason: HOSPADM

## 2019-02-07 NOTE — PROGRESS NOTES
Pt doing well. No vaginal bleeding, no loss of fluid, positive fetal movement, no contractions. Bleeding/labor/rom/fmc precautions.     Induction scheduled for 2/15/19.

## 2019-02-08 ENCOUNTER — HOSPITAL ENCOUNTER (EMERGENCY)
Facility: OTHER | Age: 29
Discharge: HOME | End: 2019-02-08
Attending: OBSTETRICS & GYNECOLOGY
Payer: COMMERCIAL

## 2019-02-08 VITALS
SYSTOLIC BLOOD PRESSURE: 123 MMHG | WEIGHT: 192.25 LBS | DIASTOLIC BLOOD PRESSURE: 85 MMHG | TEMPERATURE: 96 F | OXYGEN SATURATION: 97 % | BODY MASS INDEX: 34.06 KG/M2 | HEART RATE: 100 BPM | HEIGHT: 63 IN | RESPIRATION RATE: 18 BRPM

## 2019-02-08 DIAGNOSIS — Z3A.38 38 WEEKS GESTATION OF PREGNANCY: Primary | ICD-10-CM

## 2019-02-08 DIAGNOSIS — O99.820 GBS (GROUP B STREPTOCOCCUS CARRIER), +RV CULTURE, CURRENTLY PREGNANT: ICD-10-CM

## 2019-02-08 DIAGNOSIS — Z03.71 ENCOUNTER FOR SUSPECTED PREMATURE RUPTURE OF AMNIOTIC MEMBRANES, WITH RUPTURE OF MEMBRANES NOT FOUND: ICD-10-CM

## 2019-02-08 DIAGNOSIS — O13.3 TRANSIENT HYPERTENSION OF PREGNANCY IN THIRD TRIMESTER: ICD-10-CM

## 2019-02-08 LAB
BILIRUB SERPL-MCNC: NORMAL MG/DL
BLOOD URINE, POC: NORMAL
COLOR, POC UA: CLEAR
GLUCOSE UR QL STRIP: NORMAL
KETONES UR QL STRIP: NORMAL
LEUKOCYTE ESTERASE URINE, POC: NORMAL
NITRITE, POC UA: NORMAL
PH, POC UA: NORMAL
PROTEIN, POC: NORMAL
SPECIFIC GRAVITY, POC UA: NORMAL
UROBILINOGEN, POC UA: NORMAL

## 2019-02-08 PROCEDURE — 81002 URINALYSIS NONAUTO W/O SCOPE: CPT

## 2019-02-08 PROCEDURE — 99283 PR EMERGENCY DEPT VISIT,LEVEL III: ICD-10-PCS | Mod: 25,,, | Performed by: OBSTETRICS & GYNECOLOGY

## 2019-02-08 PROCEDURE — 99283 EMERGENCY DEPT VISIT LOW MDM: CPT | Mod: 25,,, | Performed by: OBSTETRICS & GYNECOLOGY

## 2019-02-08 PROCEDURE — 99284 EMERGENCY DEPT VISIT MOD MDM: CPT | Mod: 25

## 2019-02-08 PROCEDURE — 59025 FETAL NON-STRESS TEST: CPT | Mod: 26,,, | Performed by: OBSTETRICS & GYNECOLOGY

## 2019-02-08 PROCEDURE — 59025 FETAL NON-STRESS TEST: CPT

## 2019-02-08 PROCEDURE — 59025 PR FETAL 2N-STRESS TEST: ICD-10-PCS | Mod: 26,,, | Performed by: OBSTETRICS & GYNECOLOGY

## 2019-02-08 NOTE — DISCHARGE INSTRUCTIONS
Call clinic [phone] or L & D after hours at [phone] for vaginal bleeding, leakage of fluids, regular contractions every 5 mins for 2 hours, decreased fetal movements ( 10 kicks in 2 hours), headache not relieved by Tylenol, blurry vision, or temp of 100.4 or greater.  Begin doing fetal kick counts, at least 10 movements in 2 hours starting at 28 weeks gestation.  Keep next clinic appointment

## 2019-02-08 NOTE — ED PROVIDER NOTES
"Encounter Date: 2019       History     Chief Complaint   Patient presents with    Rupture of Membranes    Contractions     29 yo  @ 38 0/7 wga presents with irregular contractions and increased vaginal discharge for 1 day & some pink spotting today.  Her last labor was very fast - 4 hours, so she was concerned.  She reports irregular contractions.  She has normal fetal movement.     She denies HA/Visual changes.          Review of patient's allergies indicates:  No Known Allergies  No past medical history on file.  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"     Family History   Problem Relation Age of Onset    Hyperlipidemia Father     Hypertension Father     Arthritis Paternal Grandmother     Asthma Paternal Grandmother     Hypertension Paternal Grandmother     Cancer Paternal Grandfather     Heart disease Paternal Grandfather     No Known Problems Mother     Cancer Maternal Grandmother     Breast cancer Neg Hx     Colon cancer Neg Hx     Eclampsia Neg Hx     Miscarriages / Stillbirths Neg Hx     Diabetes Neg Hx     Ovarian cancer Neg Hx      labor Neg Hx     Stroke Neg Hx      Social History     Tobacco Use    Smoking status: Never Smoker    Smokeless tobacco: Never Used   Substance Use Topics    Alcohol use: Yes     Comment: occasional/pre pregnancy    Drug use: No     Review of Systems   Constitutional: Negative.    HENT: Negative.    Respiratory: Negative.    Cardiovascular: Negative.    Gastrointestinal: Positive for abdominal pain. Negative for constipation, diarrhea and nausea.   Genitourinary: Positive for vaginal bleeding (pink spotting x 1 day) and vaginal discharge. Negative for dysuria.   Musculoskeletal: Negative.    Neurological: Negative.        Physical Exam     Initial Vitals [19 0642]   BP Pulse Resp Temp SpO2   (!) 127/90 72 18 96.3 °F " (35.7 °C) 100 %      MAP       --         Physical Exam    Vitals reviewed.  Constitutional: She appears well-developed and well-nourished. She is not diaphoretic. No distress.   HENT:   Head: Normocephalic and atraumatic.   Nose: Nose normal.   Eyes: Right eye exhibits no discharge. Left eye exhibits no discharge. No scleral icterus.   Cardiovascular: Normal rate, regular rhythm, normal heart sounds and intact distal pulses.   Pulmonary/Chest: Breath sounds normal. No respiratory distress. She has no wheezes.   Abdominal: Soft. Bowel sounds are normal. There is no tenderness.   Gravid; nontender; size= dates   Musculoskeletal: She exhibits no edema or tenderness.   Neurological: She is alert and oriented to person, place, and time. She has normal strength and normal reflexes.   Skin: Skin is warm and dry. Capillary refill takes less than 2 seconds.   Psychiatric: She has a normal mood and affect. Her behavior is normal. Judgment and thought content normal.     OB LABOR EXAM:   Pre-Term Labor: No.   Membranes ruptured: No.   Method: Sterile speculum exam per MD.   Vaginal Bleeding: spotting.   Engagement: engaged.   Dilatation: 3.   Station: -2.   Effacement: 80%.   Amniotic Fluid Color: no fluid.     Comments: 06:50: SSE: negative pooling; negative valsalva; negative ferning; negative nitrazine; Milky vaginal discharge with pink tinge; Cervix: 3.5/80/-2 cephalic by sutures.        ED Course   Obtain Fetal nonstress test (NST)  Date/Time: 2/8/2019 2:31 PM  Performed by: Anaid Amado MD  Authorized by: Virgen Lopez MD     Nonstress Test:     Variability:  6-25 BPM    Decelerations:  None    Accelerations:  15 bpm    Baseline:  140    Uterine Irritability: Yes      Contractions:  Irregular  Biophysical Profile:     Nonstress Test Interpretation: reactive      Overall Impression:  Reassuring      Labs Reviewed   POCT URINALYSIS, DIPSTICK OR TABLET REAGENT, AUTOMATED, WITH MICROSCOP          Imaging  Results    None          Medical Decision Making:   ED Management:  - VSS  - NST reactive and reassuring  - SSE: negative pooling, negative nitrazine, negative ferning  - SVE: 3.5/80/-2, unchanged after 2 hours  - DC home with labor precautions given                      Clinical Impression:   The primary encounter diagnosis was 38 weeks gestation of pregnancy. Diagnoses of GBS (group B Streptococcus carrier), +RV culture, currently pregnant, Encounter for suspected premature rupture of amniotic membranes, with rupture of membranes not found, and Transient hypertension of pregnancy in third trimester were also pertinent to this visit.                             Anya Joseph MD  Resident  02/08/19 0102       Anaid Amado MD  02/08/19 [id number]

## 2019-02-11 ENCOUNTER — PATIENT MESSAGE (OUTPATIENT)
Dept: OBSTETRICS AND GYNECOLOGY | Facility: CLINIC | Age: 29
End: 2019-02-11

## 2019-02-15 ENCOUNTER — ANESTHESIA EVENT (OUTPATIENT)
Dept: OBSTETRICS AND GYNECOLOGY | Facility: OTHER | Age: 29
End: 2019-02-15
Payer: COMMERCIAL

## 2019-02-15 ENCOUNTER — RESEARCH ENCOUNTER (OUTPATIENT)
Dept: RESEARCH | Facility: HOSPITAL | Age: 29
End: 2019-02-15

## 2019-02-15 ENCOUNTER — ANESTHESIA (OUTPATIENT)
Dept: OBSTETRICS AND GYNECOLOGY | Facility: OTHER | Age: 29
End: 2019-02-15
Payer: COMMERCIAL

## 2019-02-15 ENCOUNTER — HOSPITAL ENCOUNTER (INPATIENT)
Facility: OTHER | Age: 29
LOS: 1 days | Discharge: HOME | End: 2019-02-16
Attending: OBSTETRICS & GYNECOLOGY | Admitting: OBSTETRICS & GYNECOLOGY
Payer: COMMERCIAL

## 2019-02-15 DIAGNOSIS — Z34.90 ENCOUNTER FOR INDUCTION OF LABOR: ICD-10-CM

## 2019-02-15 LAB
ABO + RH BLD: NORMAL
BASOPHILS # BLD AUTO: 0.01 K/UL
BASOPHILS NFR BLD: 0.1 %
BLD GP AB SCN CELLS X3 SERPL QL: NORMAL
DIFFERENTIAL METHOD: NORMAL
EOSINOPHIL # BLD AUTO: 0.1 K/UL
EOSINOPHIL NFR BLD: 0.8 %
ERYTHROCYTE [DISTWIDTH] IN BLOOD BY AUTOMATED COUNT: 12.9 %
HCT VFR BLD AUTO: 37.7 %
HGB BLD-MCNC: 12.7 G/DL
LYMPHOCYTES # BLD AUTO: 2.2 K/UL
LYMPHOCYTES NFR BLD: 29.7 %
MCH RBC QN AUTO: 29 PG
MCHC RBC AUTO-ENTMCNC: 33.7 G/DL
MCV RBC AUTO: 86 FL
MONOCYTES # BLD AUTO: 0.7 K/UL
MONOCYTES NFR BLD: 9.2 %
NEUTROPHILS # BLD AUTO: 4.5 K/UL
NEUTROPHILS NFR BLD: 59.7 %
PLATELET # BLD AUTO: 217 K/UL
PMV BLD AUTO: 11.2 FL
RBC # BLD AUTO: 4.38 M/UL
WBC # BLD AUTO: 7.5 K/UL

## 2019-02-15 PROCEDURE — 86901 BLOOD TYPING SEROLOGIC RH(D): CPT

## 2019-02-15 PROCEDURE — 27800517 HC TRAY,EPIDURAL-CONTINUOUS: Performed by: ANESTHESIOLOGY

## 2019-02-15 PROCEDURE — 11000001 HC ACUTE MED/SURG PRIVATE ROOM

## 2019-02-15 PROCEDURE — 63600175 PHARM REV CODE 636 W HCPCS: Performed by: STUDENT IN AN ORGANIZED HEALTH CARE EDUCATION/TRAINING PROGRAM

## 2019-02-15 PROCEDURE — 25000003 PHARM REV CODE 250: Performed by: ANESTHESIOLOGY

## 2019-02-15 PROCEDURE — 25000003 PHARM REV CODE 250: Performed by: STUDENT IN AN ORGANIZED HEALTH CARE EDUCATION/TRAINING PROGRAM

## 2019-02-15 PROCEDURE — 85025 COMPLETE CBC W/AUTO DIFF WBC: CPT

## 2019-02-15 PROCEDURE — 62326 NJX INTERLAMINAR LMBR/SAC: CPT | Performed by: ANESTHESIOLOGY

## 2019-02-15 PROCEDURE — 72200005 HC VAGINAL DELIVERY LEVEL II

## 2019-02-15 PROCEDURE — 51702 INSERT TEMP BLADDER CATH: CPT

## 2019-02-15 PROCEDURE — 59409 OBSTETRICAL CARE: CPT | Mod: QY,,, | Performed by: ANESTHESIOLOGY

## 2019-02-15 PROCEDURE — 72100002 HC LABOR CARE, 1ST 8 HOURS

## 2019-02-15 PROCEDURE — 27200710 HC EPIDURAL INFUSION PUMP SET: Performed by: ANESTHESIOLOGY

## 2019-02-15 PROCEDURE — 59400 OBSTETRICAL CARE: CPT | Mod: ,,, | Performed by: OBSTETRICS & GYNECOLOGY

## 2019-02-15 PROCEDURE — 59400 PR FULL ROUT OBSTE CARE,VAGINAL DELIV: ICD-10-PCS | Mod: ,,, | Performed by: OBSTETRICS & GYNECOLOGY

## 2019-02-15 PROCEDURE — 59409 PRA ETRICAL CARE,VAG DELIV ONLY: ICD-10-PCS | Mod: QY,,, | Performed by: ANESTHESIOLOGY

## 2019-02-15 RX ORDER — FENTANYL/BUPIVACAINE/NS/PF 2MCG/ML-.1
PLASTIC BAG, INJECTION (ML) INJECTION
Status: COMPLETED
Start: 2019-02-15 | End: 2019-02-15

## 2019-02-15 RX ORDER — OXYTOCIN/RINGER'S LACTATE 20/1000 ML
41.7 PLASTIC BAG, INJECTION (ML) INTRAVENOUS CONTINUOUS
Status: DISCONTINUED | OUTPATIENT
Start: 2019-02-15 | End: 2019-02-15

## 2019-02-15 RX ORDER — BUPIVACAINE HYDROCHLORIDE 2.5 MG/ML
INJECTION, SOLUTION EPIDURAL; INFILTRATION; INTRACAUDAL
Status: COMPLETED
Start: 2019-02-15 | End: 2019-02-15

## 2019-02-15 RX ORDER — MISOPROSTOL 200 UG/1
600 TABLET ORAL
Status: DISCONTINUED | OUTPATIENT
Start: 2019-02-15 | End: 2019-02-16 | Stop reason: HOSPADM

## 2019-02-15 RX ORDER — MISOPROSTOL 200 UG/1
600 TABLET ORAL
Status: DISCONTINUED | OUTPATIENT
Start: 2019-02-15 | End: 2019-02-15

## 2019-02-15 RX ORDER — OXYTOCIN/RINGER'S LACTATE 20/1000 ML
333 PLASTIC BAG, INJECTION (ML) INTRAVENOUS CONTINUOUS
Status: DISCONTINUED | OUTPATIENT
Start: 2019-02-15 | End: 2019-02-15

## 2019-02-15 RX ORDER — ACETAMINOPHEN 325 MG/1
650 TABLET ORAL EVERY 6 HOURS PRN
Status: DISCONTINUED | OUTPATIENT
Start: 2019-02-15 | End: 2019-02-16 | Stop reason: HOSPADM

## 2019-02-15 RX ORDER — DIPHENHYDRAMINE HYDROCHLORIDE 50 MG/ML
25 INJECTION INTRAMUSCULAR; INTRAVENOUS EVERY 4 HOURS PRN
Status: DISCONTINUED | OUTPATIENT
Start: 2019-02-15 | End: 2019-02-16 | Stop reason: HOSPADM

## 2019-02-15 RX ORDER — OXYTOCIN/RINGER'S LACTATE 20/1000 ML
2 PLASTIC BAG, INJECTION (ML) INTRAVENOUS CONTINUOUS
Status: DISCONTINUED | OUTPATIENT
Start: 2019-02-15 | End: 2019-02-15

## 2019-02-15 RX ORDER — HYDROCODONE BITARTRATE AND ACETAMINOPHEN 10; 325 MG/1; MG/1
1 TABLET ORAL EVERY 4 HOURS PRN
Status: DISCONTINUED | OUTPATIENT
Start: 2019-02-15 | End: 2019-02-16 | Stop reason: HOSPADM

## 2019-02-15 RX ORDER — LIDOCAINE HYDROCHLORIDE AND EPINEPHRINE 15; 5 MG/ML; UG/ML
INJECTION, SOLUTION EPIDURAL
Status: DISCONTINUED | OUTPATIENT
Start: 2019-02-15 | End: 2019-02-15

## 2019-02-15 RX ORDER — ONDANSETRON 2 MG/ML
4 INJECTION INTRAMUSCULAR; INTRAVENOUS ONCE AS NEEDED
Status: DISCONTINUED | OUTPATIENT
Start: 2019-02-15 | End: 2019-02-15

## 2019-02-15 RX ORDER — FENTANYL/BUPIVACAINE/NS/PF 2MCG/ML-.1
PLASTIC BAG, INJECTION (ML) INJECTION
Status: DISCONTINUED | OUTPATIENT
Start: 2019-02-15 | End: 2019-02-15

## 2019-02-15 RX ORDER — SODIUM CHLORIDE, SODIUM LACTATE, POTASSIUM CHLORIDE, CALCIUM CHLORIDE 600; 310; 30; 20 MG/100ML; MG/100ML; MG/100ML; MG/100ML
INJECTION, SOLUTION INTRAVENOUS CONTINUOUS
Status: DISCONTINUED | OUTPATIENT
Start: 2019-02-15 | End: 2019-02-15

## 2019-02-15 RX ORDER — IBUPROFEN 600 MG/1
600 TABLET ORAL EVERY 6 HOURS
Status: DISCONTINUED | OUTPATIENT
Start: 2019-02-15 | End: 2019-02-16 | Stop reason: HOSPADM

## 2019-02-15 RX ORDER — HYDROCODONE BITARTRATE AND ACETAMINOPHEN 5; 325 MG/1; MG/1
1 TABLET ORAL EVERY 4 HOURS PRN
Status: DISCONTINUED | OUTPATIENT
Start: 2019-02-15 | End: 2019-02-16 | Stop reason: HOSPADM

## 2019-02-15 RX ORDER — DIPHENHYDRAMINE HCL 25 MG
25 CAPSULE ORAL EVERY 4 HOURS PRN
Status: DISCONTINUED | OUTPATIENT
Start: 2019-02-15 | End: 2019-02-16 | Stop reason: HOSPADM

## 2019-02-15 RX ORDER — ONDANSETRON 8 MG/1
8 TABLET, ORALLY DISINTEGRATING ORAL EVERY 8 HOURS PRN
Status: DISCONTINUED | OUTPATIENT
Start: 2019-02-15 | End: 2019-02-15

## 2019-02-15 RX ORDER — FENTANYL/BUPIVACAINE/NS/PF 2MCG/ML-.1
PLASTIC BAG, INJECTION (ML) INJECTION CONTINUOUS PRN
Status: DISCONTINUED | OUTPATIENT
Start: 2019-02-15 | End: 2019-02-15

## 2019-02-15 RX ORDER — ONDANSETRON 8 MG/1
8 TABLET, ORALLY DISINTEGRATING ORAL EVERY 8 HOURS PRN
Status: DISCONTINUED | OUTPATIENT
Start: 2019-02-15 | End: 2019-02-16 | Stop reason: HOSPADM

## 2019-02-15 RX ORDER — SODIUM CHLORIDE 9 MG/ML
INJECTION, SOLUTION INTRAVENOUS
Status: DISCONTINUED | OUTPATIENT
Start: 2019-02-15 | End: 2019-02-15

## 2019-02-15 RX ORDER — BUPIVACAINE HYDROCHLORIDE 2.5 MG/ML
INJECTION, SOLUTION EPIDURAL; INFILTRATION; INTRACAUDAL
Status: DISPENSED
Start: 2019-02-15 | End: 2019-02-15

## 2019-02-15 RX ORDER — FENTANYL/BUPIVACAINE/NS/PF 2MCG/ML-.1
10 PLASTIC BAG, INJECTION (ML) INJECTION CONTINUOUS
Status: DISCONTINUED | OUTPATIENT
Start: 2019-02-15 | End: 2019-02-15

## 2019-02-15 RX ORDER — DOCUSATE SODIUM 100 MG/1
200 CAPSULE, LIQUID FILLED ORAL 2 TIMES DAILY PRN
Status: DISCONTINUED | OUTPATIENT
Start: 2019-02-15 | End: 2019-02-16 | Stop reason: HOSPADM

## 2019-02-15 RX ORDER — OXYTOCIN/RINGER'S LACTATE 20/1000 ML
41.65 PLASTIC BAG, INJECTION (ML) INTRAVENOUS CONTINUOUS
Status: ACTIVE | OUTPATIENT
Start: 2019-02-15 | End: 2019-02-15

## 2019-02-15 RX ORDER — CEFAZOLIN SODIUM 2 G/50ML
2 SOLUTION INTRAVENOUS
Status: DISCONTINUED | OUTPATIENT
Start: 2019-02-15 | End: 2019-02-15

## 2019-02-15 RX ORDER — HYDROCORTISONE 25 MG/G
CREAM TOPICAL 3 TIMES DAILY PRN
Status: DISCONTINUED | OUTPATIENT
Start: 2019-02-15 | End: 2019-02-16 | Stop reason: HOSPADM

## 2019-02-15 RX ADMIN — IBUPROFEN 600 MG: 600 TABLET ORAL at 06:02

## 2019-02-15 RX ADMIN — DEXTROSE 2.5 MILLION UNITS: 50 INJECTION, SOLUTION INTRAVENOUS at 09:02

## 2019-02-15 RX ADMIN — ACETAMINOPHEN 650 MG: 325 TABLET, FILM COATED ORAL at 03:02

## 2019-02-15 RX ADMIN — Medication 10 ML/HR: at 06:02

## 2019-02-15 RX ADMIN — LIDOCAINE HYDROCHLORIDE,EPINEPHRINE BITARTRATE 3 ML: 15; .005 INJECTION, SOLUTION EPIDURAL; INFILTRATION; INTRACAUDAL; PERINEURAL at 05:02

## 2019-02-15 RX ADMIN — Medication 5 ML: at 05:02

## 2019-02-15 RX ADMIN — ONDANSETRON 8 MG: 8 TABLET, ORALLY DISINTEGRATING ORAL at 05:02

## 2019-02-15 RX ADMIN — SODIUM CHLORIDE, SODIUM LACTATE, POTASSIUM CHLORIDE, AND CALCIUM CHLORIDE: .6; .31; .03; .02 INJECTION, SOLUTION INTRAVENOUS at 04:02

## 2019-02-15 RX ADMIN — DEXTROSE 5 MILLION UNITS: 50 INJECTION, SOLUTION INTRAVENOUS at 05:02

## 2019-02-15 RX ADMIN — Medication 2 MILLI-UNITS/MIN: at 05:02

## 2019-02-15 RX ADMIN — DOCUSATE SODIUM 200 MG: 100 CAPSULE, LIQUID FILLED ORAL at 08:02

## 2019-02-15 RX ADMIN — Medication 5 ML: at 06:02

## 2019-02-15 NOTE — PROGRESS NOTES
LABOR PROGRESS NOTE    S:  MD to bedside for cervical check. Complaints: No.  Epidural in place and working     O: Temp:  [97.7 °F (36.5 °C)] 97.7 °F (36.5 °C)  Pulse:  [56-70] 56  Resp:  [18] 18  SpO2:  [91 %-100 %] 100 %  BP: (104-124)/(62-74) 120/71      FHT: 135BPM/Mod beat to beat variability/+accels/-decels Cat 1 (reassuring)  CTX: q 2-5 minutes  SVE: 5/80/-2  AROM performed, blood tinged      ASSESSMENT:   28 y.o.  at 39w0d,     FHT reassuring    Active Hospital Problems    Diagnosis  POA    Encounter for induction of labor [Z34.90]  Not Applicable      Resolved Hospital Problems   No resolved problems to display.     430: /-3  630: 5/80/-2, AROM     PLAN:    Labor management  Continue Close Maternal/Fetal Monitoring.   Pitocin Augmentation per protocol  Recheck 2 hours or PRN      Lindy Lopez MD

## 2019-02-15 NOTE — L&D DELIVERY NOTE
cephalic after approximately 15 minutes of maternal pushing.  Under epidural anesthesia.  Infant delivered OA over intact perineum.  Male infant also tolerated the delivery well and was placed on mothers abdomen for skin to skin and bulb suctioning performed.  Cord clamped and cut.  Umbilical venous blood obtained.  Placenta delivered spontaneously and IV pitocin given.  Uterine tone noted. No cervical lacerations.  Patient tolerated delivery well.  EBL 150cc  Staff present for entire procedure.  S/L/N counts correct x2.    Anya Joseph MD   OBGYN, PGY-1       Delivery Information for  Dylan Pierre    Birth information:  YOB: 2020   Time of birth: 9:33 AM   Sex: male   Head Delivery Date/Time: 2/15/2019  9:33 AM   Delivery type: Vaginal, Spontaneous   Gestational Age: 39w0d    Delivery Providers    Delivering clinician:  Nayana Soares MD   Provider Role    NAN Johnson RN Katelyn F. Simmons, MD             Measurements    Weight:  3118 g  Length:           Apgars    Living status:  Living  Apgars:   1 min.:   5 min.:   10 min.:   15 min.:   20 min.:     Skin color:          Heart rate:          Reflex irritability:          Muscle tone:          Respiratory effort:          Total:                 Operative Delivery    Forceps attempted?:  No  Vacuum extractor attempted?:  No         Shoulder Dystocia    Shoulder dystocia present?:  No           Presentation    Presentation:  Vertex  Position:  Middle Occiput Anterior           Interventions/Resuscitation    Method:  Tactile Stimulation, Bulb Suctioning       Cord    Vessels:  3 vessels  Complications:  None  Delayed Cord Clamping?:  No  Cord Blood Disposition:  Sent with Baby  Gases Sent?:  No       Placenta    Placenta delivery date/time:  2/15/2019 0935  Placenta removal:  Spontaneous  Placenta appearance:  Intact  Placenta disposition:  discarded           Labor  Events:       labor: No     Labor Onset Date/Time:         Dilation Complete Date/Time:         Start Pushing Date/Time:       Rupture Date/Time:              Rupture type:           Fluid Amount:        Fluid Color:        Fluid Odor:        Membrane Status (PeriCalm): ARM (Artificial Rupture)      Rupture Date/Time (PeriCalm): 02/15/2019 06:35:00      Fluid Amount (PeriCalm): Moderate      Fluid Color (PeriCalm): Clear       steroids: None     Antibiotics given for GBS: Yes     Induction: oxytocin     Indications for induction:  Elective     Augmentation:       Indications for augmentation:       Labor complications: None     Additional complications:          Cervical ripening:                     Delivery:      Episiotomy: None     Indication for Episiotomy:       Perineal Lacerations: None Repaired:      Periurethral Laceration: none Repaired:     Labial Laceration: none Repaired:     Sulcus Laceration: none Repaired:     Vaginal Laceration: No Repaired:     Cervical Laceration: No Repaired:     Repair suture: None     Repair # of packets:       Vaginal delivery QBL (mL): 150      QBL (mL): 0     Combined Blood Loss (mL): 150     Vaginal Sweep Performed: No     Surgicount Correct: No       Other providers:       Anesthesia    Method:  Epidural          Details (if applicable):  Trial of Labor      Categorization:      Priority:     Indications for :     Incision Type:       Additional  information:  Forceps:    Vacuum:    Breech:    Observed anomalies    Other (Comments):

## 2019-02-15 NOTE — ANESTHESIA PROCEDURE NOTES
Epidural    Patient location during procedure: OB   Reason for block: primary anesthetic   Diagnosis: IUP   Start time: 2/15/2019 5:42 AM  Timeout: 2/15/2019 5:42 AM  End time: 2/15/2019 5:48 AM  Staffing  Anesthesiologist: Rupal Thompson MD  Other anesthesia staff: Chi Cordero MD  Performed: other anesthesia staff   Preanesthetic Checklist  Completed: patient identified, surgical consent, pre-op evaluation, timeout performed, IV checked, risks and benefits discussed, monitors and equipment checked, anesthesia consent given, hand hygiene performed and patient being monitored  Preparation  Patient position: sitting  Prep: ChloraPrep  Patient monitoring: Pulse Ox and Blood Pressure  Epidural  Skin Anesthetic: lidocaine 1%  Skin Wheal: 3 mL  Administration type: continuous  Approach: midline  Interspace: L2-3    Needle and Epidural Catheter  Needle type: Tuohy   Needle gauge: 17  Needle length: 3.5 inches  Needle insertion depth: 5 cm  Catheter type: springwound and multi-orifice  Catheter size: 19 G  Catheter at skin depth: 9 cm  Test dose: 3 mL of lidocaine 1.5% with Epi 1-to-200,000  Additional Documentation: incremental injection, negative aspiration for heme and CSF, no paresthesia on injection, no signs/symptoms of IV or SA injection, no significant pain on injection and no significant complaints from patient  Needle localization: anatomical landmarks  Assessment  Ease of block: easy  Patient's tolerance of the procedure: comfortable throughout block and no complaintsNo inadvertent dural puncture with Tuohy.  Dural puncture performed with spinal needle.

## 2019-02-15 NOTE — ANESTHESIA PREPROCEDURE EVALUATION
"                                                                                                             02/15/2019   [facility]  Anesthesia Pre-Operative Evaluation       Patient Name: Larissa Pierre  YOB: 1990  MRN: [id number]    SUBJECTIVE:     Larissa Pierre is a 28 y.o. female  39w0d presenting for elective induction of labor.  No complications during this pregnancy.    Denies hx of seizures, strokes, HTN, heart failure, smoking, asthma, COPD, acid reflux, liver disease, kidney disease, bleeding disorders, taking blood thinners, back surgery.    Lab Results   Component Value Date     02/15/2019       NPO since midnight.    Prior vaginal delivery with epidural; no issues with placement or function. For this pregnancy, patient would like neuraxial analgesia.    Denies previous issues with general anesthesia.  Denies family history of issues with anesthesia.     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"        OB History    Para Term  AB Living   2 1 1     1   SAB TAB Ectopic Multiple Live Births         0        # Outcome Date GA Lbr Charles/2nd Weight Sex Delivery Anes PTL Lv   2 Current            1 Term 16 38w4d / 01:57 2.91 kg (6 lb 6.7 oz) F Vag-Spont EPI N           Patient Active Problem List   Diagnosis    GBS (group B Streptococcus carrier), +RV culture, currently pregnant    Encounter for induction of labor       Review of patient's allergies indicates:  No Known Allergies    Social History     Socioeconomic History    Marital status:      Spouse name: Not on file    Number of children: Not on file    Years of education: Not on file    Highest education level: Not on file   Social Needs    Financial resource strain: Not on file    Food insecurity - worry: Not on file    Food insecurity - inability: Not on file    " Transportation needs - medical: Not on file    Transportation needs - non-medical: Not on file   Occupational History    Not on file   Tobacco Use    Smoking status: Never Smoker    Smokeless tobacco: Never Used   Substance and Sexual Activity    Alcohol use: Yes     Comment: occasional/pre pregnancy    Drug use: No    Sexual activity: Yes     Partners: Male     Birth control/protection: None     Comment:    Other Topics Concern    Not on file   Social History Narrative    Not on file       OBJECTIVE:     Outpatient Medications:  No current facility-administered medications on file prior to encounter.      Current Outpatient Medications on File Prior to Encounter   Medication Sig Dispense Refill    ASCORBATE CALCIUM (VITAMIN C ORAL) Take by mouth once daily.      CLINDAGEL 1 % glqd       FINACEA 15 % Foam           Current Inpatient Medications:   bupivacaine (PF) 0.25% (2.5 mg/ml)        lactated ringers  1,000 mL Intravenous Once    pencillin G potassium IVPB  2.5 Million Units Intravenous Q4H       Wt Readings from Last 1 Encounters:   02/08/19 0704 87.2 kg (192 lb 3.9 oz)   02/08/19 0642 87.2 kg (192 lb 3.9 oz)       BP Readings from Last 3 Encounters:   02/15/19 104/62   02/08/19 123/85   02/07/19 112/68         Chemistry    No results found for: NA, K, CL, CO2, BUN, CREATININE, GLU No results found for: CALCIUM, ALKPHOS, AST, ALT, BILITOT, ESTGFRAFRICA, EGFRNONAA         Lab Results   Component Value Date    WBC 7.50 02/15/2019    HGB 12.7 02/15/2019    HCT 37.7 02/15/2019    MCV 86 02/15/2019     02/15/2019       No results for input(s): PT, INR, PROTIME, APTT in the last 72 hours.      Anesthesia Evaluation         Review of Systems      Physical Exam  General:  Well nourished    Airway/Jaw/Neck:  Airway Findings: Mouth Opening: Normal Tongue: Normal  General Airway Assessment: Adult  Mallampati: III  TM Distance: Normal, at least 6 cm     Eyes/Ears/Nose:  Eyes/Ears/Nose  Findings:    Dental:  Dental Findings: In tact   Chest/Lungs:  Chest/Lungs Findings: Clear to auscultation, Normal Respiratory Rate     Heart/Vascular:  Heart Findings: Rate: Normal  Rhythm: Regular Rhythm        Mental Status:  Mental Status Findings:  Cooperative, Alert and Oriented         Anesthesia Plan  Type of Anesthesia, risks & benefits discussed:  Anesthesia Type:  CSE, epidural, general, spinal, MAC  Patient's Preference:   Intra-op Monitoring Plan:   Intra-op Monitoring Plan Comments:   Post Op Pain Control Plan: multimodal analgesia  Post Op Pain Control Plan Comments:   Induction:    Beta Blocker:  Patient is not currently on a Beta-Blocker (No further documentation required).       Informed Consent: Patient understands risks and agrees with Anesthesia plan.  Questions answered. Anesthesia consent signed with patient.  ASA Score: 2     Day of Surgery Review of History & Physical:  There are no significant changes.          Ready For Surgery From Anesthesia Perspective.

## 2019-02-15 NOTE — PROGRESS NOTES
Bijal 2016.167 Consent note    Ms. Pierre was admitted on today 02/15/2019 for induction of labor. She was approached and consented for the Prevena study by Dr. Magda Ewing in private L&D suite, and they discussed the study in detail, including the purpose, numbers/length, procedures, risk/benefit, cost/payment, contacts (investigators/IRB), alternatives/voluntary nature/withdrawal, confidentiality/HIPPA. Dr. Jenkins was available for questions and patient had no further questions.  Patient also did consent to data storage for future studies.  The consent form was signed on today 02/15/2019 at 0700 and patient was given a copy of signed informed consent.  Patient will be randomized into study if a C/S is called.

## 2019-02-15 NOTE — PROGRESS NOTES
LABOR PROGRESS NOTE    S:  MD to bedside for cervical check. Complaints: No.  Epidural in place, some pain with ctx, anesthesia notified    O: Temp:  [97.7 °F (36.5 °C)] 97.7 °F (36.5 °C)  Pulse:  [50-78] 61  Resp:  [18] 18  SpO2:  [88 %-100 %] 99 %  BP: ()/(53-80) 117/80      FHT: 130BPM/Mod beat to beat variability/+accels/-decels Cat 1 (reassuring)  CTX: q 1-3 minutes  SVE:       ASSESSMENT:   28 y.o.  at 39w0d, elective IOL    FHT reassuring    Active Hospital Problems    Diagnosis  POA    Encounter for induction of labor [Z34.90]  Not Applicable      Resolved Hospital Problems   No resolved problems to display.     0430: /-3  0630: /-2, AROM   0830: 1, pit @ 2mU    PLAN:    Labor management  Continue Close Maternal/Fetal Monitoring.   Pitocin Augmentation per protocol  Recheck 2 hours or PRN      Anya Joseph MD   OBGYN, PGY-1

## 2019-02-15 NOTE — H&P
"   HISTORY AND PHYSICAL                                                OBSTETRICS          Subjective:       Larissa Pierre is a 28 y.o.  female with IUP at 39w0d weeks gestation who presents for elective induction of labor.    Patient reports occasional contractions, denies vaginal bleeding, denies LOF.   Fetal Movement: normal.    This IUP is complicated by GBS positive status.    Review of Systems   Constitutional: Negative for chills and fever.   Eyes: Negative for visual disturbance.   Respiratory: Negative for shortness of breath.    Cardiovascular: Negative for chest pain and palpitations.   Gastrointestinal: Negative for abdominal pain, constipation, diarrhea, nausea and vomiting.   Genitourinary: Negative for vaginal bleeding and vaginal discharge.   Musculoskeletal: Negative for back pain.   Neurological: Negative for seizures, syncope and headaches.   Hematological: Does not bruise/bleed easily.   Psychiatric/Behavioral: Negative for depression. The patient is not nervous/anxious.        PMHx: No past medical history on file.    PSHx:   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"       All: Review of patient's allergies indicates:  No Known Allergies    Meds:   Medications Prior to Admission   Medication Sig Dispense Refill Last Dose    ASCORBATE CALCIUM (VITAMIN C ORAL) Take by mouth once daily.   Taking    CLINDAGEL 1 % glqd    Taking    FINACEA 15 % Foam    Taking    ondansetron (ZOFRAN-ODT) 4 MG TbDL Take 1 tablet (4 mg total) by mouth every 8 (eight) hours as needed. 30 tablet 0        SH:   Social History     Socioeconomic History    Marital status:      Spouse name: Not on file    Number of children: Not on file    Years of education: Not on file    Highest education level: Not on file   Social Needs    Financial resource strain: Not on file    Food " insecurity - worry: Not on file    Food insecurity - inability: Not on file    Transportation needs - medical: Not on file    Transportation needs - non-medical: Not on file   Occupational History    Not on file   Tobacco Use    Smoking status: Never Smoker    Smokeless tobacco: Never Used   Substance and Sexual Activity    Alcohol use: Yes     Comment: occasional/pre pregnancy    Drug use: No    Sexual activity: Yes     Partners: Male     Birth control/protection: None     Comment:    Other Topics Concern    Not on file   Social History Narrative    Not on file       FH:   Family History   Problem Relation Age of Onset    Hyperlipidemia Father     Hypertension Father     Arthritis Paternal Grandmother     Asthma Paternal Grandmother     Hypertension Paternal Grandmother     Cancer Paternal Grandfather     Heart disease Paternal Grandfather     No Known Problems Mother     Cancer Maternal Grandmother     Breast cancer Neg Hx     Colon cancer Neg Hx     Eclampsia Neg Hx     Miscarriages / Stillbirths Neg Hx     Diabetes Neg Hx     Ovarian cancer Neg Hx      labor Neg Hx     Stroke Neg Hx        OBHx:   Obstetric History       T1      L1     SAB0   TAB0   Ectopic0   Multiple0   Live Births0       # Outcome Date GA Lbr Charles/2nd Weight Sex Delivery Anes PTL Lv   2 Current            1 Term 16 38w4d / 01:57 2.91 kg (6 lb 6.7 oz) F Vag-Spont EPI N       Apgar1:  9                Apgar5: 9          Objective:       LMP 2018   Breastfeeding? No     There were no vitals filed for this visit.    General:   alert, appears stated age and cooperative, no apparent distress   HENT:  normocephalic, atraumatic   Eyes:  extraocular movements and conjunctivae normal   Neck:  supple, range of motion normal, no thyromegaly   Lungs:   no respiratory distress   Heart:   regular rate   Abdomen:  soft, non-tender, non-distended but gravid, no rebound or guarding     Extremities negative edema, negative erythema   FHT: 130, moderate BTBV, +accels, -decels;  Cat 1 (reassuring)                 TOCO: Irregular contractions/irritability    Presentations: cephalic by ultrasound   Cervix:     Dilation: 4    Effacement: 70    Station:  -3       EFW by Leopold's: 7.5    Lab Review  Blood Type B POS  GBBS: positive  Rubella: Immune  RPR: NR  HIV: negative  HepB: negative       Assessment:       39w0d weeks gestation for elective induction of labor     There are no hospital problems to display for this patient.         Plan:      Risks, benefits, alternatives and possible complications have been discussed in detail with the patient.   - Consents signed and to chart  - Admit to Labor and Delivery unit  - PCN for GBS prophylaxis   - cervix 4/70/-3  - Start pitocin as soon as IV access available    - Epidural per Anesthesia  - Draw CBC, T&S  - Notify Staff  - Recheck in 2 hrs or PRN    Post-Partum Hemorrhage risk - low    Aleyda Tan MD  OBGYN, PGY-1

## 2019-02-16 VITALS
DIASTOLIC BLOOD PRESSURE: 71 MMHG | SYSTOLIC BLOOD PRESSURE: 107 MMHG | HEART RATE: 60 BPM | RESPIRATION RATE: 18 BRPM | OXYGEN SATURATION: 99 % | TEMPERATURE: 99 F

## 2019-02-16 LAB
BASOPHILS # BLD AUTO: 0.02 K/UL
BASOPHILS NFR BLD: 0.2 %
DIFFERENTIAL METHOD: ABNORMAL
EOSINOPHIL # BLD AUTO: 0 K/UL
EOSINOPHIL NFR BLD: 0.3 %
ERYTHROCYTE [DISTWIDTH] IN BLOOD BY AUTOMATED COUNT: 13.1 %
HCT VFR BLD AUTO: 31.9 %
HGB BLD-MCNC: 10.3 G/DL
LYMPHOCYTES # BLD AUTO: 1.7 K/UL
LYMPHOCYTES NFR BLD: 18.6 %
MCH RBC QN AUTO: 28 PG
MCHC RBC AUTO-ENTMCNC: 32.3 G/DL
MCV RBC AUTO: 87 FL
MONOCYTES # BLD AUTO: 0.7 K/UL
MONOCYTES NFR BLD: 8 %
NEUTROPHILS # BLD AUTO: 6.7 K/UL
NEUTROPHILS NFR BLD: 72.6 %
PLATELET # BLD AUTO: 172 K/UL
PMV BLD AUTO: 10.5 FL
RBC # BLD AUTO: 3.68 M/UL
WBC # BLD AUTO: 9.2 K/UL

## 2019-02-16 PROCEDURE — 99233 SBSQ HOSP IP/OBS HIGH 50: CPT | Mod: ,,, | Performed by: OBSTETRICS & GYNECOLOGY

## 2019-02-16 PROCEDURE — 85025 COMPLETE CBC W/AUTO DIFF WBC: CPT

## 2019-02-16 PROCEDURE — 99233 PR SUBSEQUENT HOSPITAL CARE,LEVL III: ICD-10-PCS | Mod: ,,, | Performed by: OBSTETRICS & GYNECOLOGY

## 2019-02-16 PROCEDURE — 36415 COLL VENOUS BLD VENIPUNCTURE: CPT

## 2019-02-16 PROCEDURE — 25000003 PHARM REV CODE 250: Performed by: STUDENT IN AN ORGANIZED HEALTH CARE EDUCATION/TRAINING PROGRAM

## 2019-02-16 RX ORDER — IBUPROFEN 600 MG/1
600 TABLET ORAL EVERY 6 HOURS
Qty: 30 TABLET | Refills: 1 | Status: SHIPPED | OUTPATIENT
Start: 2019-02-16 | End: 2022-10-20 | Stop reason: CLARIF

## 2019-02-16 RX ADMIN — IBUPROFEN 600 MG: 600 TABLET ORAL at 06:02

## 2019-02-16 RX ADMIN — IBUPROFEN 600 MG: 600 TABLET ORAL at 11:02

## 2019-02-16 RX ADMIN — IBUPROFEN 600 MG: 600 TABLET ORAL at 12:02

## 2019-02-16 NOTE — ANESTHESIA POSTPROCEDURE EVALUATION
Anesthesia Post Evaluation    Patient: Larissa Pierre    Procedure(s) Performed: * No procedures listed *    Final Anesthesia Type: epidural  Patient location during evaluation: labor & delivery  Patient participation: Yes- Able to Participate  Level of consciousness: awake and alert  Post-procedure vital signs: reviewed and stable  Pain management: adequate  Airway patency: patent  PONV status at discharge: No PONV  Anesthetic complications: no      Cardiovascular status: hemodynamically stable  Respiratory status: unassisted, spontaneous ventilation and room air  Hydration status: euvolemic  Follow-up not needed.        Visit Vitals  /71   Pulse 60   Temp 36.9 °C (98.5 °F) (Oral)   Resp 18   LMP 05/18/2018   SpO2 99%   Breastfeeding? Yes       Pain/Cherelle Score: Pain Rating Prior to Med Admin: 4 (2/16/2019  6:09 AM)  Pain Rating Post Med Admin: 0 (2/16/2019  7:45 AM)

## 2019-02-16 NOTE — DISCHARGE INSTRUCTIONS
Breastfeeding Discharge Instructions       Feed the baby at the earliest sign of hunger or comfort  o Hands to mouth, sucking motions  o Rooting or searching for something to suck on  o Dont wait for crying - it is a sign of distress     The feedings may be 8-12 times per 24hrs and will not follow a schedule   Avoid pacifiers and bottles for the first 4 weeks   Alternate the breast you start the feeding with, or start with the breast that feels the fullest   Switch breasts when the baby takes himself off the breast or falls asleep   Keep offering breasts until the baby looks full, no longer gives hunger signs, and stays asleep when placed on his back in the crib   If the baby is sleepy and wont wake for a feeding, put the baby skin-to-skin dressed in a diaper against the mothers bare chest   Sleep near your baby   The baby should be positioned and latched on to the breast correctly  o Chest-to-chest, chin in the breast  o Babys lips are flipped outward  o Babys mouth is stretched open wide like a shout  o Babys sucking should feel like tugging to the mother  - The baby should be drinking at the breast:  o You should hear swallowing or gulping throughout the feeding  o You should see milk on the babys lips when he comes off the breast  o Your breasts should be softer when the baby is finished feeding  o The baby should look relaxed at the end of feedings  o After the 4th day and your milk is in:  o The babys poop should turn bright yellow and be loose, watery, and seedy  o The baby should have at least 3-4 poops the size of the palm of your hand per day  o The baby should have at least 5-6 wet diapers per day  o The urine should be light yellow in color  You should drink when you are thirsty and eat a healthy diet when you are    hungry.     Take naps to get the rest you need.   Take medications and/or drink alcohol only with permission of your obstetrician    or the babys pediatrician.  You can  also call the [facility] Risk [facility],   ([phone]), Monday-Friday, 8am-5pm Central time, to get the most   up-to-date evidence-based information on the use of medications during   pregnancy and breastfeeding.      The baby should be examined by a pediatrician at 3-5 days of age.  Once your   milk comes in, the baby should be gaining at least ½ - 1oz each day and should be back to birthweight no later than 10-14 days of age.          Community Resources    [facility] Breastfeeding Warmline: [phone]   Local [facility] clinics: provide incentives and breastpumps to eligible mothers  [employer] (LLLI):  mother-to-mother support group website        www.MagTagl.Metabolomx  Local La Leche League mother-to-mother support groups:        www.[employer]        La Leche League [facility]   Dr. Mango Ambrose website for latch videos and general information:        www.breastfeedinginc.ca  [facility] is a call center that provides information about the safety of taking medications while breastfeeding.  Call 1-275.507.2459, M-F, 8am-5pm, [place].  International Lactation Consultant Association provides resources for assistance:        www.ilca.org  [place] Breastfeeding Coalition provides informationand resources for parents  and the community    www.LaBreastfeedingSupport.org     [employer] Wylie is a mom-to-mom support group:                             www.Sply[employer]ena[employer]entorMob.com//breastfeedng-support/  [employer] for Healthy Babies:  8-824-957-BABY(2631)  [employer]: a breastfeeding support group for women of color, [phone]

## 2019-02-16 NOTE — LACTATION NOTE
02/15/19 1624   Reproductive Interventions   Breastfeeding Assistance feeding cue recognition promoted;support offered   Breastfeeding Support encouragement provided;lactation counseling provided;maternal hydration promoted   Provided basic lactation education; requested patient call lactation for assistance with breastfeeding;

## 2019-02-16 NOTE — PROGRESS NOTES
POSTPARTUM PROGRESS NOTE     Larissa Pierre is a 28 y.o. female PPD #1 status post Spontaneous vaginal delivery at 39w1d in a pregnancy complicated by GBS positive status. Patient is doing well this morning. She denies nausea, vomiting, fever or chills.  Patient reports mild abdominal pain that is well relieved by oral pain medications. Lochia is mild and stable. Patient is voiding without difficulty and ambulating with no difficulty. She has passed flatus, and has not had BM.  Patient does plan to breast feed. Will see her primary OB for contraception. She desires circumcision for her male infant.     Objective:       Temp:  [97.7 °F (36.5 °C)-98.2 °F (36.8 °C)] 98.2 °F (36.8 °C)  Pulse:  [] 60  Resp:  [18] 18  SpO2:  [91 %-100 %] 100 %  BP: (100-132)/(56-74) 111/61    General:   alert, appears stated age and cooperative   Lungs:   clear to auscultation bilaterally   Heart:   regular rate and rhythm, S1, S2 normal, no murmur, click, rub or gallop   Abdomen:  soft, non-tender; bowel sounds normal; no masses,  no organomegaly   Uterus:  firm located at the umblicus.        Extremities: no pedal edema noted     Lab Review  Recent Results (from the past 4 hour(s))   CBC auto differential    Collection Time: 02/16/19  6:17 AM   Result Value Ref Range    WBC 9.20 3.90 - 12.70 K/uL    RBC 3.68 (L) 4.00 - 5.40 M/uL    Hemoglobin 10.3 (L) 12.0 - 16.0 g/dL    Hematocrit 31.9 (L) 37.0 - 48.5 %    MCV 87 82 - 98 fL    MCH 28.0 27.0 - 31.0 pg    MCHC 32.3 32.0 - 36.0 g/dL    RDW 13.1 11.5 - 14.5 %    Platelets 172 150 - 350 K/uL    MPV 10.5 9.2 - 12.9 fL    Gran # (ANC) 6.7 1.8 - 7.7 K/uL    Lymph # 1.7 1.0 - 4.8 K/uL    Mono # 0.7 0.3 - 1.0 K/uL    Eos # 0.0 0.0 - 0.5 K/uL    Baso # 0.02 0.00 - 0.20 K/uL    Gran% 72.6 38.0 - 73.0 %    Lymph% 18.6 18.0 - 48.0 %    Mono% 8.0 4.0 - 15.0 %    Eosinophil% 0.3 0.0 - 8.0 %    Basophil% 0.2 0.0 - 1.9 %    Differential Method Automated        I/O    Intake/Output Summary  (Last 24 hours) at 2019 0740  Last data filed at 2/15/2019 1301  Gross per 24 hour   Intake --   Output 575 ml   Net -575 ml        Assessment:     Patient Active Problem List   Diagnosis    GBS (group B Streptococcus carrier), +RV culture, currently pregnant    Encounter for induction of labor     (spontaneous vaginal delivery)        Plan:   1. Postpartum care:  - Patient doing well. Continue routine management and advances.  - Continue PO pain meds. Pain well controlled.  - Heme: H/h 10/31 > postpartum CBC pending  - Encourage ambulation  - Circumcision - desires, consent signed and orders placed  - Contraception per primary OB  - Lactation consult PRN      Dispo: As patient meets milestones, will plan to discharge PPD#1-2.    Sushma K. Reddy, MD  PGY-2, OBGYN

## 2019-02-16 NOTE — PLAN OF CARE
Problem: Adult Inpatient Plan of Care  Goal: Plan of Care Review  Outcome: Ongoing (interventions implemented as appropriate)  Developed the following breastfeeding plan of care with patient: she will breastfeed baby on cue until content at least 8 times in 24 hours observing for signs of milk transfer; she will wake baby prn; she will avoid bottles, formula and pacifiers;

## 2019-02-16 NOTE — LACTATION NOTE
02/16/19 1110   Lactation Referrals   Lactation Referrals pediatric care provider;outpatient lactation program   Provided lactation discharge education; reviewed Mother's Breastfeeding Guide; requested patient call lactation for assistance with breastfeeding; patient with history of bilateral breast augmentation 10 years ago; she successfully  her first baby; advised to use breastpump, highest comfortable suction, a few times a day for 15 minutes per breast for 2 weeks to provided extra stimulation to her breasts in view of  breast augmentation;

## 2019-02-16 NOTE — DISCHARGE SUMMARY
Delivery Discharge Summary  Obstetrics      Primary OB Clinician: Nayana Soares MD      Admission date: 2/15/2019  Discharge date: 2019    Disposition: To home, self care    Discharge Diagnosis List:      Patient Active Problem List   Diagnosis    GBS (group B Streptococcus carrier), +RV culture, currently pregnant    Encounter for induction of labor     (spontaneous vaginal delivery)       Procedure:     Hospital Course:  Larissa Pierre is a 28 y.o. now , PPD #1 who was admitted on 2/15/2019 at 39w0d for elective induction of labor. Patient was subsequently admitted to labor and delivery unit with signed consents.     Labor course was uncomplicated and resulted in  without complications.     Please see delivery note for further details. Her postpartum course was uncomplicated. On discharge day, patient's pain is controlled with oral pain medications. Pt is tolerating ambulation without SOB or CP, and regular diet without N/V. Reports lochia is mild. Denies any HA, vision changes, F/C, LE swelling. Denies any breast pain/soreness.    Pt in stable condition and ready for discharge. She has been instructed to start and/or continue medications and follow up with her obstetrics provider as listed below.    Pertinent studies:  CBC  Recent Labs   Lab 02/15/19  0457 19  0617   WBC 7.50 9.20   HGB 12.7 10.3*   HCT 37.7 31.9*   MCV 86 87    172        Immunization History   Administered Date(s) Administered    HPV Quadrivalent 10/12/2011    Influenza - Quadrivalent - PF 10/01/2018    Tdap 2016, 2018        Delivery:    Episiotomy: None   Lacerations: None   Repair suture: None   Repair # of packets:     Blood loss (ml): 150     Birth information:  YOB: 2020   Time of birth: 9:33 AM   Sex: male   Delivery type: Vaginal, Spontaneous   Gestational Age: 39w0d    Delivery Clinician:      Other providers:       Additional  information:  Forceps:     Vacuum:    Breech:    Observed anomalies      Living?:           APGARS  One minute Five minutes Ten minutes   Skin color:         Heart rate:         Grimace:         Muscle tone:         Breathing:         Totals:           Placenta: Delivered:       appearance      Patient Instructions:   Current Discharge Medication List      START taking these medications    Details   ibuprofen (ADVIL,MOTRIN) 600 MG tablet Take 1 tablet (600 mg total) by mouth every 6 (six) hours.  Qty: 30 tablet, Refills: 1         CONTINUE these medications which have NOT CHANGED    Details   ASCORBATE CALCIUM (VITAMIN C ORAL) Take by mouth once daily.      CLINDAGEL 1 % glqd       FINACEA 15 % Foam          STOP taking these medications       ondansetron (ZOFRAN-ODT) 4 MG TbDL Comments:   Reason for Stopping:               Discharge Procedure Orders   Diet Adult Regular     Notify your health care provider if you experience any of the following:  temperature >100.4     Notify your health care provider if you experience any of the following:  persistent nausea and vomiting or diarrhea     Notify your health care provider if you experience any of the following:  severe uncontrolled pain     Notify your health care provider if you experience any of the following:  difficulty breathing or increased cough     Notify your health care provider if you experience any of the following:  severe persistent headache     Notify your health care provider if you experience any of the following:  worsening rash     Notify your health care provider if you experience any of the following:  persistent dizziness, light-headedness, or visual disturbances     Notify your health care provider if you experience any of the following:  increased confusion or weakness     Notify your health care provider if you experience any of the following:   Order Comments: Notify MD if bleeding 1 pad/hour for 2 consecutive hours.     No dressing needed     Activity as tolerated        Follow-up Information     Nayana Soares MD In 6 weeks.    Specialties:  Obstetrics and Gynecology, Obstetrics  Why:  Postpartum visit  Contact information:  [address] [id number] [phone]                    Magda Ewing MD  OB/GYN  PGY-1

## 2019-03-11 ENCOUNTER — PATIENT MESSAGE (OUTPATIENT)
Dept: OBSTETRICS AND GYNECOLOGY | Facility: CLINIC | Age: 29
End: 2019-03-11

## 2019-03-28 ENCOUNTER — POSTPARTUM VISIT (OUTPATIENT)
Dept: OBSTETRICS AND GYNECOLOGY | Facility: CLINIC | Age: 29
End: 2019-03-28
Payer: COMMERCIAL

## 2019-03-28 VITALS
WEIGHT: 179.88 LBS | DIASTOLIC BLOOD PRESSURE: 78 MMHG | HEIGHT: 63 IN | BODY MASS INDEX: 31.87 KG/M2 | SYSTOLIC BLOOD PRESSURE: 110 MMHG

## 2019-03-28 DIAGNOSIS — Z12.4 PAP SMEAR FOR CERVICAL CANCER SCREENING: ICD-10-CM

## 2019-03-28 PROCEDURE — 88175 CYTOPATH C/V AUTO FLUID REDO: CPT | Performed by: PATHOLOGY

## 2019-03-28 PROCEDURE — 99999 PR PBB SHADOW E&M-EST. PATIENT-LVL III: CPT | Mod: PBBFAC,,, | Performed by: OBSTETRICS & GYNECOLOGY

## 2019-03-28 PROCEDURE — 0503F POSTPARTUM CARE VISIT: CPT | Mod: S$GLB,,, | Performed by: OBSTETRICS & GYNECOLOGY

## 2019-03-28 PROCEDURE — 88141 CYTOPATH C/V INTERPRET: CPT | Mod: ,,, | Performed by: PATHOLOGY

## 2019-03-28 PROCEDURE — 0503F PR POSTPARTUM CARE VISIT: ICD-10-PCS | Mod: S$GLB,,, | Performed by: OBSTETRICS & GYNECOLOGY

## 2019-03-28 PROCEDURE — 88141 LIQUID-BASED PAP SMEAR, SCREENING: ICD-10-PCS | Mod: ,,, | Performed by: PATHOLOGY

## 2019-03-28 PROCEDURE — 99999 PR PBB SHADOW E&M-EST. PATIENT-LVL III: ICD-10-PCS | Mod: PBBFAC,,, | Performed by: OBSTETRICS & GYNECOLOGY

## 2019-03-28 RX ORDER — ACETAMINOPHEN AND CODEINE PHOSPHATE 120; 12 MG/5ML; MG/5ML
1 SOLUTION ORAL DAILY
Qty: 30 TABLET | Refills: 11 | Status: SHIPPED | OUTPATIENT
Start: 2019-03-28 | End: 2019-09-09 | Stop reason: ALTCHOICE

## 2019-03-28 NOTE — PROGRESS NOTES
"CC: Post-partum follow-up    Larissa Pierre is a 28 y.o. female  presents for post-partum visit s/p a .    Delivery Date: February 15, 2019  Delivery MD: Nayana Soares  Gender: male  Birth Weight: 6 pounds 14 ounces  Breast Feeding: YES  Depression: NO  Contraception: oral progesterone-only contraceptive, vasectomy in next few weeks.     Pregnancy was complicated by: none      /78   Ht 5' 3" (1.6 m)   Wt 81.6 kg (179 lb 14.3 oz)   LMP 2018   BMI 31.87 kg/m²     ROS:  GENERAL: No fever, chills, fatigability or weight loss.  VULVAR: No pain, no lesions and no itching.  VAGINAL: No relaxation, no itching, no discharge, no abnormal bleeding and no lesions.  ABDOMEN: No abdominal pain. Denies nausea. Denies vomiting. No diarrhea. No constipation  BREAST: Denies pain. No lumps. No discharge.  URINARY: No incontinence, no nocturia, no frequency and no dysuria.  CARDIOVASCULAR: No chest pain. No shortness of breath. No leg cramps.  NEUROLOGICAL: No headaches. No vision changes.    PHYSICAL EXAM:  PELVIC: EGBUS within normal limits, normal vagina and vulva    PROCEDURES:  - Pap smear        Diagnosis:  1. Routine postpartum follow-up    2. Pap smear for cervical cancer screening        Plan:     Orders Placed This Encounter    Liquid-based pap smear, screening    norethindrone (ORTHO MICRONOR) 0.35 mg tablet         Patient was counseled today on A.C.S. Pap guidelines and recommendations for yearly pelvic exams and monthly self breast exams; to see her PCP for other health maintenance.    FOLLOW UP: in 1 year for routine exam.    "

## 2019-04-09 ENCOUNTER — PATIENT MESSAGE (OUTPATIENT)
Dept: OBSTETRICS AND GYNECOLOGY | Facility: CLINIC | Age: 29
End: 2019-04-09

## 2019-04-09 NOTE — TELEPHONE ENCOUNTER
Patient has been notified of unsatisfactory pap smear results through [facility].     Please call to schedule repeat pap smear at her convenience.     Thanks!

## 2019-09-09 ENCOUNTER — PATIENT MESSAGE (OUTPATIENT)
Dept: OBSTETRICS AND GYNECOLOGY | Facility: CLINIC | Age: 29
End: 2019-09-09

## 2019-09-23 ENCOUNTER — PATIENT MESSAGE (OUTPATIENT)
Dept: OBSTETRICS AND GYNECOLOGY | Facility: CLINIC | Age: 29
End: 2019-09-23

## 2019-10-08 ENCOUNTER — PATIENT MESSAGE (OUTPATIENT)
Dept: OBSTETRICS AND GYNECOLOGY | Facility: CLINIC | Age: 29
End: 2019-10-08

## 2019-10-08 DIAGNOSIS — N92.6 MENSTRUAL DISORDER: Primary | ICD-10-CM

## 2019-10-09 ENCOUNTER — PATIENT MESSAGE (OUTPATIENT)
Dept: OBSTETRICS AND GYNECOLOGY | Facility: CLINIC | Age: 29
End: 2019-10-09

## 2019-10-11 ENCOUNTER — LAB VISIT (OUTPATIENT)
Dept: LAB | Facility: HOSPITAL | Age: 29
End: 2019-10-11
Attending: OBSTETRICS & GYNECOLOGY
Payer: COMMERCIAL

## 2019-10-11 DIAGNOSIS — N92.6 MENSTRUAL DISORDER: ICD-10-CM

## 2019-10-11 LAB
T4 FREE SERPL-MCNC: 1.11 NG/DL (ref 0.71–1.51)
TSH SERPL DL<=0.005 MIU/L-ACNC: 1.29 UIU/ML (ref 0.4–4)

## 2019-10-11 PROCEDURE — 36415 COLL VENOUS BLD VENIPUNCTURE: CPT

## 2019-10-11 PROCEDURE — 84443 ASSAY THYROID STIM HORMONE: CPT

## 2019-10-11 PROCEDURE — 84439 ASSAY OF FREE THYROXINE: CPT

## 2020-07-07 RX ORDER — NORETHINDRONE AND ETHINYL ESTRADIOL 0.4-0.035
KIT ORAL
Qty: 28 TABLET | Refills: 2 | Status: SHIPPED | OUTPATIENT
Start: 2020-07-07 | End: 2020-10-04 | Stop reason: SDUPTHER

## 2020-07-07 NOTE — TELEPHONE ENCOUNTER
Left message on patient's voicemail that she needs to schedule an annual exam.   LUIS ANTONIO Ortiz

## 2020-10-12 ENCOUNTER — OFFICE VISIT (OUTPATIENT)
Dept: OBSTETRICS AND GYNECOLOGY | Facility: CLINIC | Age: 30
End: 2020-10-12
Payer: COMMERCIAL

## 2020-10-12 VITALS
DIASTOLIC BLOOD PRESSURE: 72 MMHG | BODY MASS INDEX: 29.64 KG/M2 | SYSTOLIC BLOOD PRESSURE: 130 MMHG | WEIGHT: 167.31 LBS

## 2020-10-12 DIAGNOSIS — Z12.4 SCREENING FOR MALIGNANT NEOPLASM OF CERVIX: ICD-10-CM

## 2020-10-12 DIAGNOSIS — Z01.419 ENCOUNTER FOR ANNUAL ROUTINE GYNECOLOGICAL EXAMINATION: Primary | ICD-10-CM

## 2020-10-12 DIAGNOSIS — N92.6 IRREGULAR MENSES: ICD-10-CM

## 2020-10-12 PROCEDURE — 99395 PR PREVENTIVE VISIT,EST,18-39: ICD-10-PCS | Mod: S$GLB,,, | Performed by: NURSE PRACTITIONER

## 2020-10-12 PROCEDURE — 99999 PR PBB SHADOW E&M-EST. PATIENT-LVL III: ICD-10-PCS | Mod: PBBFAC,,, | Performed by: NURSE PRACTITIONER

## 2020-10-12 PROCEDURE — 87624 HPV HI-RISK TYP POOLED RSLT: CPT

## 2020-10-12 PROCEDURE — 88175 CYTOPATH C/V AUTO FLUID REDO: CPT

## 2020-10-12 PROCEDURE — 99395 PREV VISIT EST AGE 18-39: CPT | Mod: S$GLB,,, | Performed by: NURSE PRACTITIONER

## 2020-10-12 PROCEDURE — 99999 PR PBB SHADOW E&M-EST. PATIENT-LVL III: CPT | Mod: PBBFAC,,, | Performed by: NURSE PRACTITIONER
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
## 2020-10-22 LAB
HPV HR 12 DNA SPEC QL NAA+PROBE: NEGATIVE
HPV16 AG SPEC QL: NEGATIVE
HPV18 DNA SPEC QL NAA+PROBE: NEGATIVE

## 2020-11-08 LAB
FINAL PATHOLOGIC DIAGNOSIS: NORMAL
Lab: NORMAL

## 2020-11-18 ENCOUNTER — PATIENT MESSAGE (OUTPATIENT)
Dept: OBSTETRICS AND GYNECOLOGY | Facility: CLINIC | Age: 30
End: 2020-11-18

## 2020-11-18 RX ORDER — DROSPIRENONE AND ETHINYL ESTRADIOL 0.02-3(28)
1 KIT ORAL DAILY
Qty: 90 TABLET | Refills: 3 | Status: SHIPPED | OUTPATIENT
Start: 2020-11-18 | End: 2021-09-20

## 2021-11-15 ENCOUNTER — PATIENT MESSAGE (OUTPATIENT)
Dept: OBSTETRICS AND GYNECOLOGY | Facility: CLINIC | Age: 31
End: 2021-11-15
Payer: COMMERCIAL

## 2022-06-01 RX ORDER — DROSPIRENONE AND ETHINYL ESTRADIOL 0.02-3(28)
KIT ORAL
Qty: 30 TABLET | Refills: 11 | Status: ON HOLD | OUTPATIENT
Start: 2022-06-01 | End: 2022-10-24 | Stop reason: HOSPADM

## 2022-06-01 NOTE — TELEPHONE ENCOUNTER
----- Message from Marisela Maldonado sent at 6/1/2022 10:37 AM CDT -----  Pt is calling for refill on script  drospirenone-ethinyl estradioL (KING) 3-0.02 mg per tablet  [facility] - [place] [address]  Pt can be contacted at [phone]

## 2022-09-08 ENCOUNTER — OFFICE VISIT (OUTPATIENT)
Dept: OBSTETRICS AND GYNECOLOGY | Facility: CLINIC | Age: 32
End: 2022-09-08
Payer: COMMERCIAL

## 2022-09-08 VITALS
DIASTOLIC BLOOD PRESSURE: 86 MMHG | SYSTOLIC BLOOD PRESSURE: 123 MMHG | BODY MASS INDEX: 31.99 KG/M2 | WEIGHT: 180.56 LBS | HEIGHT: 63 IN

## 2022-09-08 DIAGNOSIS — N92.1 METRORRHAGIA: ICD-10-CM

## 2022-09-08 DIAGNOSIS — E66.9 OBESITY (BMI 30-39.9): ICD-10-CM

## 2022-09-08 DIAGNOSIS — Z01.419 ENCOUNTER FOR GYNECOLOGICAL EXAMINATION: Primary | ICD-10-CM

## 2022-09-08 PROCEDURE — 99999 PR PBB SHADOW E&M-EST. PATIENT-LVL IV: ICD-10-PCS | Mod: PBBFAC,,, | Performed by: OBSTETRICS & GYNECOLOGY

## 2022-09-08 PROCEDURE — 99395 PR PREVENTIVE VISIT,EST,18-39: ICD-10-PCS | Mod: S$GLB,,, | Performed by: OBSTETRICS & GYNECOLOGY

## 2022-09-08 PROCEDURE — 99999 PR PBB SHADOW E&M-EST. PATIENT-LVL IV: CPT | Mod: PBBFAC,,, | Performed by: OBSTETRICS & GYNECOLOGY

## 2022-09-08 PROCEDURE — 99395 PREV VISIT EST AGE 18-39: CPT | Mod: S$GLB,,, | Performed by: OBSTETRICS & GYNECOLOGY

## 2022-09-08 NOTE — PROGRESS NOTES
HPI: Pt is a 32 y.o.  female who presents for routine annual exam. She uses vasectomy for contraception. She does not want STD screening. Reports that she stopped her OCPs in January and had bleeding/spotting daily for over a month. It was so annoying that she went back on the OCPs. She would like to stop OCPs since her  had a vasectomy and has been trying to lose weight and is worried staying on OCPs is not helping her. She is interested in surgical management with an ablation.     Pap/HPV 10/2020 NORMAL.       ROS:  GENERAL: Feeling well overall. Denies fever or chills.   SKIN: Denies rash or lesions.   HEAD: Denies head injury or headache.   NODES: Denies enlarged lymph nodes.   CHEST: Denies chest pain or shortness of breath.   CARDIOVASCULAR: Denies palpitations or left sided chest pain.   ABDOMEN: No abdominal pain, constipation, diarrhea, nausea or vomiting   URINARY: No dysuria, hematuria, or burning on urination.  REPRODUCTIVE: See HPI.   BREASTS: Denies pain, lumps, or nipple discharge.   HEMATOLOGIC: No easy bruisability or excessive bleeding.   MUSCULOSKELETAL: Denies joint pain or swelling.   NEUROLOGIC: Denies syncope or weakness.   PSYCHIATRIC: Denies acute depression or anxiety     PE:   APPEARANCE: Well nourished, well developed, White female in no acute distress.  NODES: no inguinal lymphadenopathy  BREASTS: Symmetrical, no skin changes or visible lesions. No palpable masses, nipple discharge or adenopathy bilaterally.  ABDOMEN: Soft. No tenderness or masses. No distention.   VULVA: No lesions. Normal external female genitalia.  URETHRAL MEATUS: Normal size and location, no lesions, no prolapse.  URETHRA: No masses, tenderness, or prolapse.  VAGINA: Moist. No lesions or lacerations noted. No abnormal discharge present. No odor present.   CERVIX: No lesions or discharge. No cervical motion tenderness.   UTERUS: Normal size, regular shape, mobile, non-tender.  ADNEXA: No tenderness. No  fullness or masses palpated in the adnexal regions.   ANUS PERINEUM: Normal.      Diagnosis:  1. Encounter for gynecological examination    2. Metrorrhagia    3. Obesity (BMI 30-39.9)        Plan:     Orders Placed This Encounter    US Pelvis Comp with Transvag NON-OB (xpd    Ambulatory referral/consult to [facility]    Case Request Operating Room: HYSTEROSCOPY, WITH DILATION AND CURETTAGE OF UTERUS, ABLATION, ENDOMETRIUM     - D+C Hscope/ablation 10/24. Surgical consents signed today.   - Pelvic u/s scheduled.     Day-of surgery routines, times of arrival, eating instructions discussed  Post op expectations and precautions reviewed    Covid testing not indicated    Indications, risks, benefits, alternatives discussed. Pt states her understanding and wishes to proceed withe the procedure.  Surgical consents signed.  The patient agrees to proceed with procedure as planned.    Patient was counseled today on the new ACS guidelines for cervical cytology screening as well as the current recommendations for breast cancer screening. She was counseled to follow up with her PCP for other routine health maintenance.     Follow-up with me in 1 year for routine exam; pap/HPV due next year.

## 2022-09-23 ENCOUNTER — PATIENT MESSAGE (OUTPATIENT)
Dept: FAMILY MEDICINE | Facility: CLINIC | Age: 32
End: 2022-09-23
Payer: COMMERCIAL

## 2022-09-23 RX ORDER — METHYLPREDNISOLONE 4 MG/1
TABLET ORAL
Qty: 21 EACH | Refills: 0 | Status: SHIPPED | OUTPATIENT
Start: 2022-09-23 | End: 2022-10-14

## 2022-09-26 ENCOUNTER — HOSPITAL ENCOUNTER (OUTPATIENT)
Dept: RADIOLOGY | Facility: HOSPITAL | Age: 32
Discharge: HOME | End: 2022-09-26
Attending: OBSTETRICS & GYNECOLOGY
Payer: COMMERCIAL

## 2022-09-26 DIAGNOSIS — N92.1 METRORRHAGIA: ICD-10-CM

## 2022-09-26 PROCEDURE — 76856 US EXAM PELVIC COMPLETE: CPT | Mod: 26,,, | Performed by: RADIOLOGY

## 2022-09-26 PROCEDURE — 76830 US PELVIS COMP WITH TRANSVAG NON-OB (XPD): ICD-10-PCS | Mod: 26,,, | Performed by: RADIOLOGY

## 2022-09-26 PROCEDURE — 76830 TRANSVAGINAL US NON-OB: CPT | Mod: 26,,, | Performed by: RADIOLOGY

## 2022-09-26 PROCEDURE — 76856 US PELVIS COMP WITH TRANSVAG NON-OB (XPD): ICD-10-PCS | Mod: 26,,, | Performed by: RADIOLOGY

## 2022-09-26 PROCEDURE — 76830 TRANSVAGINAL US NON-OB: CPT | Mod: TC

## 2022-10-06 DIAGNOSIS — J32.9 SINUSITIS, UNSPECIFIED CHRONICITY, UNSPECIFIED LOCATION: ICD-10-CM

## 2022-10-06 DIAGNOSIS — H66.90 OTITIS MEDIA: ICD-10-CM

## 2022-10-06 RX ORDER — AMOXICILLIN AND CLAVULANATE POTASSIUM 875; 125 MG/1; MG/1
1 TABLET, FILM COATED ORAL 2 TIMES DAILY
Qty: 20 TABLET | Refills: 0 | Status: SHIPPED | OUTPATIENT
Start: 2022-10-06 | End: 2022-10-16

## 2022-10-06 RX ORDER — SPIRONOLACTONE 100 MG/1
100 TABLET, FILM COATED ORAL DAILY
Status: ON HOLD | COMMUNITY
Start: 2022-04-19 | End: 2022-10-24 | Stop reason: HOSPADM

## 2022-10-17 ENCOUNTER — PATIENT MESSAGE (OUTPATIENT)
Dept: SURGERY | Facility: OTHER | Age: 32
End: 2022-10-17
Payer: COMMERCIAL

## 2022-10-20 ENCOUNTER — HOSPITAL ENCOUNTER (OUTPATIENT)
Dept: PREADMISSION TESTING | Facility: OTHER | Age: 32
Discharge: HOME | End: 2022-10-20
Attending: OBSTETRICS & GYNECOLOGY
Payer: COMMERCIAL

## 2022-10-20 ENCOUNTER — ANESTHESIA EVENT (OUTPATIENT)
Dept: SURGERY | Facility: OTHER | Age: 32
End: 2022-10-20
Payer: COMMERCIAL

## 2022-10-20 VITALS
HEART RATE: 74 BPM | SYSTOLIC BLOOD PRESSURE: 129 MMHG | OXYGEN SATURATION: 100 % | DIASTOLIC BLOOD PRESSURE: 77 MMHG | BODY MASS INDEX: 29.23 KG/M2 | TEMPERATURE: 98 F | RESPIRATION RATE: 16 BRPM | HEIGHT: 63 IN | WEIGHT: 165 LBS

## 2022-10-20 DIAGNOSIS — Z01.818 PREOP TESTING: Primary | ICD-10-CM

## 2022-10-20 LAB
ANION GAP SERPL CALC-SCNC: 6 MMOL/L (ref 8–16)
BASOPHILS # BLD AUTO: 0.02 K/UL (ref 0–0.2)
BASOPHILS NFR BLD: 0.4 % (ref 0–1.9)
BUN SERPL-MCNC: 14 MG/DL (ref 6–20)
CALCIUM SERPL-MCNC: 9.1 MG/DL (ref 8.7–10.5)
CHLORIDE SERPL-SCNC: 107 MMOL/L (ref 95–110)
CO2 SERPL-SCNC: 26 MMOL/L (ref 23–29)
CREAT SERPL-MCNC: 0.8 MG/DL (ref 0.5–1.4)
DIFFERENTIAL METHOD: NORMAL
EOSINOPHIL # BLD AUTO: 0.1 K/UL (ref 0–0.5)
EOSINOPHIL NFR BLD: 1.1 % (ref 0–8)
ERYTHROCYTE [DISTWIDTH] IN BLOOD BY AUTOMATED COUNT: 11.9 % (ref 11.5–14.5)
EST. GFR  (NO RACE VARIABLE): >60 ML/MIN/1.73 M^2
GLUCOSE SERPL-MCNC: 97 MG/DL (ref 70–110)
HCT VFR BLD AUTO: 39.9 % (ref 37–48.5)
HGB BLD-MCNC: 13.8 G/DL (ref 12–16)
IMM GRANULOCYTES # BLD AUTO: 0 K/UL (ref 0–0.04)
IMM GRANULOCYTES NFR BLD AUTO: 0 % (ref 0–0.5)
LYMPHOCYTES # BLD AUTO: 1.3 K/UL (ref 1–4.8)
LYMPHOCYTES NFR BLD: 29.2 % (ref 18–48)
MCH RBC QN AUTO: 30.5 PG (ref 27–31)
MCHC RBC AUTO-ENTMCNC: 34.6 G/DL (ref 32–36)
MCV RBC AUTO: 88 FL (ref 82–98)
MONOCYTES # BLD AUTO: 0.3 K/UL (ref 0.3–1)
MONOCYTES NFR BLD: 7.1 % (ref 4–15)
NEUTROPHILS # BLD AUTO: 2.8 K/UL (ref 1.8–7.7)
NEUTROPHILS NFR BLD: 62.2 % (ref 38–73)
NRBC BLD-RTO: 0 /100 WBC
PLATELET # BLD AUTO: 288 K/UL (ref 150–450)
PMV BLD AUTO: 9.7 FL (ref 9.2–12.9)
POTASSIUM SERPL-SCNC: 4.5 MMOL/L (ref 3.5–5.1)
RBC # BLD AUTO: 4.52 M/UL (ref 4–5.4)
SODIUM SERPL-SCNC: 139 MMOL/L (ref 136–145)
WBC # BLD AUTO: 4.52 K/UL (ref 3.9–12.7)

## 2022-10-20 PROCEDURE — 80048 BASIC METABOLIC PNL TOTAL CA: CPT | Performed by: ANESTHESIOLOGY

## 2022-10-20 PROCEDURE — 85025 COMPLETE CBC W/AUTO DIFF WBC: CPT | Performed by: ANESTHESIOLOGY

## 2022-10-20 PROCEDURE — 36415 COLL VENOUS BLD VENIPUNCTURE: CPT | Performed by: ANESTHESIOLOGY

## 2022-10-20 RX ORDER — ACETAMINOPHEN 500 MG
1000 TABLET ORAL
Status: CANCELLED | OUTPATIENT
Start: 2022-10-20 | End: 2022-10-20

## 2022-10-20 RX ORDER — SODIUM CHLORIDE, SODIUM LACTATE, POTASSIUM CHLORIDE, CALCIUM CHLORIDE 600; 310; 30; 20 MG/100ML; MG/100ML; MG/100ML; MG/100ML
INJECTION, SOLUTION INTRAVENOUS CONTINUOUS
Status: CANCELLED | OUTPATIENT
Start: 2022-10-20

## 2022-10-20 RX ORDER — LIDOCAINE HYDROCHLORIDE 10 MG/ML
0.5 INJECTION, SOLUTION EPIDURAL; INFILTRATION; INTRACAUDAL; PERINEURAL ONCE
Status: CANCELLED | OUTPATIENT
Start: 2022-10-20 | End: 2022-10-20

## 2022-10-20 RX ORDER — ONDANSETRON HYDROCHLORIDE 8 MG/1
8 TABLET, FILM COATED ORAL EVERY 8 HOURS PRN
Status: ON HOLD | COMMUNITY
End: 2022-10-24 | Stop reason: HOSPADM

## 2022-10-20 NOTE — DISCHARGE INSTRUCTIONS
Information to Prepare you for your Surgery    PRE-ADMIT TESTING -  [phone]    [address] [facility]          Your surgery has been scheduled at [facility]. We are pleased to have the opportunity to serve you. For Further Information please call [phone].    On the day of surgery please report to the Information Desk on the 1st floor.    CONTACT YOUR PHYSICIAN'S OFFICE THE DAY PRIOR TO YOUR SURGERY TO OBTAIN YOUR ARRIVAL TIME.     The evening before surgery do not eat anything after 9 p.m. ( this includes hard candy, chewing gum and mints).  You may only have GATORADE, POWERADE AND WATER  from 9 p.m. until you leave your home.   DO NOT DRINK ANY LIQUIDS ON THE WAY TO THE HOSPITAL.      Why does your anesthesiologist allow you to drink Gatorade/Powerade before surgery?  Gatorade/Powerade helps to increase your comfort before surgery and to decrease your nausea after surgery. The carbohydrates in Gatorade/Powerade help reduce your body's stress response to surgery.  If you are a diabetic-drink only water prior to surgery.      Current Visitor policy(12/27/2021) - Patients may have 2 visitors pre and post procedure. Only 2 visitors will be allowed in the Surgical building with the patient.     SPECIAL MEDICATION INSTRUCTIONS: TAKE medications checked off by the Anesthesiologist on your Medication List.    Angiogram Patients: Take medications as instructed by your physician, including aspirin.     Surgery Patients:    If you take ASPIRIN - Your PHYSICIAN/SURGEON will need to inform you IF/OR when you need to stop taking aspirin prior to your surgery.     Do Not take any medications containing IBUPROFEN.    Do Not Wear any make-up (especially eye make-up) to surgery. Please remove any false eyelashes or eyelash extensions. If you arrive the day of surgery with makeup/eyelashes on you will be required to remove prior to surgery. (There is a risk of corneal  abrasions if eye makeup/eyelash extensions are not removed)      Leave all valuables at home.   Do Not wear any jewelry or watches, including any metal in body piercings. Jewelry must be removed prior to coming to the hospital.  There is a possibility that rings that are unable to be removed may be cut off if they are on the surgical extremity.    Please remove all hair extensions, wigs, clips and any other metal accessories/ ornaments from your hair.  These items may pose a flammable/fire risk in Surgery and must be removed.    Do not shave your surgical area at least 5 days prior to your surgery. The surgical prep will be performed at the hospital according to Infection Control regulations.    Contact Lens must be removed before surgery. Either do not wear the contact lens or bring a case and solution for storage.  Please bring a container for eyeglasses or dentures as required.  Bring any paperwork your physician has provided, such as consent forms,  history and physicals, doctor's orders, etc.   Bring comfortable clothes that are loose fitting to wear upon discharge. Take into consideration the type of surgery being performed.  Maintain your diet as advised per your physician the day prior to surgery.      Adequate rest the night before surgery is advised.   Park in the Parking lot behind the hospital or in the [employer] [facility] across the street from the parking lot. Parking is complimentary.  If you will be discharged the same day as your procedure, please arrange for a responsible adult to drive you home or to accompany you if traveling by taxi.   YOU WILL NOT BE PERMITTED TO DRIVE OR TO LEAVE THE HOSPITAL ALONE AFTER SURGERY.   If you are being discharged the same day, it is strongly recommended that you arrange for someone to remain with you for the first 24 hrs following your surgery.    The Surgeon will speak to your family/visitor after your surgery regarding the outcome of your surgery and post op  care.  The Surgeon may speak to you after your surgery, but there is a possibility you may not remember the details.  Please check with your family members regarding the conversation with the Surgeon.    We strongly recommend whoever is bringing you home be present for discharge instructions.  This will ensure a thorough understanding for your post op home care.    ALL CHILDREN MUST ALWAYS BE ACCOMPANIED BY AN ADULT.    Visitors-Refer to current Visitor policy handouts.    Thank you for your cooperation.  The Staff of [facility].            Bathing Instructions with Hibiclens    Shower the evening before and morning of your procedure with Hibiclens:  Wash your face with water and your regular face wash/soap  Apply Hibiclens directly on your skin or on a wet washcloth and wash gently. When showering: Move away from the shower stream when applying Hibiclens to avoid rinsing off too soon.  Rinse thoroughly with warm water  Do not dilute Hibiclens        Dry off as usual, do not use any deodorant, powder, body lotions, perfume, after shave or cologne.

## 2022-10-20 NOTE — ANESTHESIA PREPROCEDURE EVALUATION
10/20/2022  Larissa Pierre is a 32 y.o., female.      Pre-op Assessment    I have reviewed the Patient Summary Reports.     I have reviewed the Nursing Notes. I have reviewed the NPO Status.   I have reviewed the Medications.     Review of Systems  Anesthesia Hx:  Denies Family Hx of Anesthesia complications.   Denies Personal Hx of Anesthesia complications.   Social:  Non-Smoker    Hematology/Oncology:  Hematology Normal   Oncology Normal     EENT/Dental:   chronic allergic rhinitis   Cardiovascular:  Cardiovascular Normal     Pulmonary:   Recent URI, resolved Took a course of augmentin, sinus sx's resolved, subsequent ear ache improved   Renal/:  Renal/ Normal     Hepatic/GI:  Hepatic/GI Normal    Musculoskeletal:  Musculoskeletal Normal    Neurological:  Neurology Normal    Endocrine:  Endocrine Normal    Dermatological:  Skin Normal Aldactone   Psych:  Psychiatric Normal           Physical Exam  General: Cooperative, Alert, Oriented and Well nourished    Airway:  Mallampati: II   Mouth Opening: Normal  TM Distance: Normal  Neck ROM: Normal ROM    Dental:  Intact, Retainer        Anesthesia Plan  Type of Anesthesia, risks & benefits discussed:    Anesthesia Type: Gen Supraglottic Airway  Intra-op Monitoring Plan: Standard ASA Monitors  Post Op Pain Control Plan: multimodal analgesia and IV/PO Opioids PRN  Induction:  IV  Informed Consent: Informed consent signed with the Patient and all parties understand the risks and agree with anesthesia plan.  All questions answered.   ASA Score: 2  Anesthesia Plan Notes: CBC, BMP today    Labs normal    Ready For Surgery From Anesthesia Perspective.     .

## 2022-10-21 ENCOUNTER — TELEPHONE (OUTPATIENT)
Dept: OBSTETRICS AND GYNECOLOGY | Facility: CLINIC | Age: 32
End: 2022-10-21
Payer: COMMERCIAL

## 2022-10-24 ENCOUNTER — HOSPITAL ENCOUNTER (OUTPATIENT)
Facility: OTHER | Age: 32
Discharge: HOME | End: 2022-10-24
Attending: OBSTETRICS & GYNECOLOGY | Admitting: OBSTETRICS & GYNECOLOGY
Payer: COMMERCIAL

## 2022-10-24 ENCOUNTER — ANESTHESIA (OUTPATIENT)
Dept: SURGERY | Facility: OTHER | Age: 32
End: 2022-10-24
Payer: COMMERCIAL

## 2022-10-24 DIAGNOSIS — N92.0 MENORRHAGIA WITH REGULAR CYCLE: ICD-10-CM

## 2022-10-24 DIAGNOSIS — Z98.890 S/P ENDOMETRIAL ABLATION: Primary | ICD-10-CM

## 2022-10-24 LAB
B-HCG UR QL: NEGATIVE
CTP QC/QA: YES

## 2022-10-24 PROCEDURE — 88305 TISSUE EXAM BY PATHOLOGIST: ICD-10-PCS | Mod: 26,,, | Performed by: PATHOLOGY

## 2022-10-24 PROCEDURE — 25000003 PHARM REV CODE 250: Performed by: ANESTHESIOLOGY

## 2022-10-24 PROCEDURE — 58563 PR HYSTEROSCOPY,W/ENDOMETRIAL ABLATION: ICD-10-PCS | Mod: ,,, | Performed by: OBSTETRICS & GYNECOLOGY

## 2022-10-24 PROCEDURE — 88305 TISSUE EXAM BY PATHOLOGIST: CPT | Performed by: PATHOLOGY

## 2022-10-24 PROCEDURE — 71000016 HC POSTOP RECOV ADDL HR: Performed by: OBSTETRICS & GYNECOLOGY

## 2022-10-24 PROCEDURE — 63600175 PHARM REV CODE 636 W HCPCS: Performed by: NURSE ANESTHETIST, CERTIFIED REGISTERED

## 2022-10-24 PROCEDURE — 27201423 OPTIME MED/SURG SUP & DEVICES STERILE SUPPLY: Performed by: OBSTETRICS & GYNECOLOGY

## 2022-10-24 PROCEDURE — 71000015 HC POSTOP RECOV 1ST HR: Performed by: OBSTETRICS & GYNECOLOGY

## 2022-10-24 PROCEDURE — 25000003 PHARM REV CODE 250: Performed by: OBSTETRICS & GYNECOLOGY

## 2022-10-24 PROCEDURE — 71000033 HC RECOVERY, INTIAL HOUR: Performed by: OBSTETRICS & GYNECOLOGY

## 2022-10-24 PROCEDURE — 58563 HYSTEROSCOPY ABLATION: CPT | Mod: ,,, | Performed by: OBSTETRICS & GYNECOLOGY

## 2022-10-24 PROCEDURE — 36000707: Performed by: OBSTETRICS & GYNECOLOGY

## 2022-10-24 PROCEDURE — 37000008 HC ANESTHESIA 1ST 15 MINUTES: Performed by: OBSTETRICS & GYNECOLOGY

## 2022-10-24 PROCEDURE — 88305 TISSUE EXAM BY PATHOLOGIST: CPT | Mod: 26,,, | Performed by: PATHOLOGY

## 2022-10-24 PROCEDURE — 36000706: Performed by: OBSTETRICS & GYNECOLOGY

## 2022-10-24 PROCEDURE — 81025 URINE PREGNANCY TEST: CPT | Performed by: ANESTHESIOLOGY

## 2022-10-24 PROCEDURE — 63600175 PHARM REV CODE 636 W HCPCS: Performed by: ANESTHESIOLOGY

## 2022-10-24 PROCEDURE — 25000003 PHARM REV CODE 250: Performed by: NURSE ANESTHETIST, CERTIFIED REGISTERED

## 2022-10-24 PROCEDURE — 37000009 HC ANESTHESIA EA ADD 15 MINS: Performed by: OBSTETRICS & GYNECOLOGY

## 2022-10-24 RX ORDER — HYDROMORPHONE HYDROCHLORIDE 2 MG/ML
0.4 INJECTION, SOLUTION INTRAMUSCULAR; INTRAVENOUS; SUBCUTANEOUS EVERY 5 MIN PRN
Status: DISCONTINUED | OUTPATIENT
Start: 2022-10-24 | End: 2022-10-24 | Stop reason: HOSPADM

## 2022-10-24 RX ORDER — SODIUM CHLORIDE 9 MG/ML
INJECTION, SOLUTION INTRAVENOUS CONTINUOUS
Status: DISCONTINUED | OUTPATIENT
Start: 2022-10-24 | End: 2022-10-24 | Stop reason: HOSPADM

## 2022-10-24 RX ORDER — LIDOCAINE HYDROCHLORIDE 20 MG/ML
INJECTION INTRAVENOUS
Status: DISCONTINUED | OUTPATIENT
Start: 2022-10-24 | End: 2022-10-24

## 2022-10-24 RX ORDER — SPIRONOLACTONE 50 MG/1
50 TABLET, FILM COATED ORAL DAILY
Qty: 30 TABLET | Refills: 3 | Status: SHIPPED | OUTPATIENT
Start: 2022-10-24 | End: 2022-10-24 | Stop reason: SDUPTHER

## 2022-10-24 RX ORDER — EPHEDRINE SULFATE 50 MG/ML
INJECTION, SOLUTION INTRAVENOUS
Status: DISCONTINUED | OUTPATIENT
Start: 2022-10-24 | End: 2022-10-24

## 2022-10-24 RX ORDER — LIDOCAINE HYDROCHLORIDE 10 MG/ML
0.5 INJECTION, SOLUTION EPIDURAL; INFILTRATION; INTRACAUDAL; PERINEURAL ONCE
Status: DISCONTINUED | OUTPATIENT
Start: 2022-10-24 | End: 2022-10-24 | Stop reason: HOSPADM

## 2022-10-24 RX ORDER — MIDAZOLAM HYDROCHLORIDE 1 MG/ML
INJECTION INTRAMUSCULAR; INTRAVENOUS
Status: DISCONTINUED | OUTPATIENT
Start: 2022-10-24 | End: 2022-10-24

## 2022-10-24 RX ORDER — DIPHENHYDRAMINE HYDROCHLORIDE 50 MG/ML
INJECTION INTRAMUSCULAR; INTRAVENOUS
Status: DISCONTINUED | OUTPATIENT
Start: 2022-10-24 | End: 2022-10-24

## 2022-10-24 RX ORDER — DEXAMETHASONE SODIUM PHOSPHATE 4 MG/ML
INJECTION, SOLUTION INTRA-ARTICULAR; INTRALESIONAL; INTRAMUSCULAR; INTRAVENOUS; SOFT TISSUE
Status: DISCONTINUED | OUTPATIENT
Start: 2022-10-24 | End: 2022-10-24

## 2022-10-24 RX ORDER — SODIUM CHLORIDE 0.9 % (FLUSH) 0.9 %
3 SYRINGE (ML) INJECTION
Status: DISCONTINUED | OUTPATIENT
Start: 2022-10-24 | End: 2022-10-24 | Stop reason: HOSPADM

## 2022-10-24 RX ORDER — FENTANYL CITRATE 50 UG/ML
INJECTION, SOLUTION INTRAMUSCULAR; INTRAVENOUS
Status: DISCONTINUED | OUTPATIENT
Start: 2022-10-24 | End: 2022-10-24

## 2022-10-24 RX ORDER — SODIUM CHLORIDE, SODIUM LACTATE, POTASSIUM CHLORIDE, CALCIUM CHLORIDE 600; 310; 30; 20 MG/100ML; MG/100ML; MG/100ML; MG/100ML
INJECTION, SOLUTION INTRAVENOUS CONTINUOUS
Status: DISCONTINUED | OUTPATIENT
Start: 2022-10-24 | End: 2022-10-24 | Stop reason: HOSPADM

## 2022-10-24 RX ORDER — KETOROLAC TROMETHAMINE 30 MG/ML
INJECTION, SOLUTION INTRAMUSCULAR; INTRAVENOUS
Status: DISCONTINUED | OUTPATIENT
Start: 2022-10-24 | End: 2022-10-24

## 2022-10-24 RX ORDER — IBUPROFEN 600 MG/1
600 TABLET ORAL EVERY 6 HOURS PRN
Qty: 60 TABLET | Refills: 0 | Status: SHIPPED | OUTPATIENT
Start: 2022-10-24

## 2022-10-24 RX ORDER — MEPERIDINE HYDROCHLORIDE 25 MG/ML
12.5 INJECTION INTRAMUSCULAR; INTRAVENOUS; SUBCUTANEOUS ONCE AS NEEDED
Status: DISCONTINUED | OUTPATIENT
Start: 2022-10-24 | End: 2022-10-24 | Stop reason: HOSPADM

## 2022-10-24 RX ORDER — ONDANSETRON 2 MG/ML
4 INJECTION INTRAMUSCULAR; INTRAVENOUS DAILY PRN
Status: DISCONTINUED | OUTPATIENT
Start: 2022-10-24 | End: 2022-10-24 | Stop reason: HOSPADM

## 2022-10-24 RX ORDER — OXYCODONE HYDROCHLORIDE 5 MG/1
5 TABLET ORAL EVERY 4 HOURS PRN
Qty: 5 TABLET | Refills: 0 | Status: SHIPPED | OUTPATIENT
Start: 2022-10-24

## 2022-10-24 RX ORDER — ONDANSETRON 2 MG/ML
INJECTION INTRAMUSCULAR; INTRAVENOUS
Status: DISCONTINUED | OUTPATIENT
Start: 2022-10-24 | End: 2022-10-24

## 2022-10-24 RX ORDER — OXYCODONE HYDROCHLORIDE 5 MG/1
5 TABLET ORAL EVERY 4 HOURS PRN
Qty: 5 TABLET | Refills: 0 | Status: SHIPPED | OUTPATIENT
Start: 2022-10-24 | End: 2022-10-24 | Stop reason: SDUPTHER

## 2022-10-24 RX ORDER — SPIRONOLACTONE 50 MG/1
50 TABLET, FILM COATED ORAL DAILY
Qty: 30 TABLET | Refills: 3 | Status: SHIPPED | OUTPATIENT
Start: 2022-10-24 | End: 2023-02-21

## 2022-10-24 RX ORDER — ACETAMINOPHEN 500 MG
1000 TABLET ORAL
Status: COMPLETED | OUTPATIENT
Start: 2022-10-24 | End: 2022-10-24

## 2022-10-24 RX ORDER — PROPOFOL 10 MG/ML
VIAL (ML) INTRAVENOUS
Status: DISCONTINUED | OUTPATIENT
Start: 2022-10-24 | End: 2022-10-24

## 2022-10-24 RX ORDER — OXYCODONE HYDROCHLORIDE 5 MG/1
5 TABLET ORAL
Status: DISCONTINUED | OUTPATIENT
Start: 2022-10-24 | End: 2022-10-24 | Stop reason: HOSPADM

## 2022-10-24 RX ADMIN — LIDOCAINE HYDROCHLORIDE 100 MG: 20 INJECTION, SOLUTION INTRAVENOUS at 07:10

## 2022-10-24 RX ADMIN — OXYCODONE 5 MG: 5 TABLET ORAL at 08:10

## 2022-10-24 RX ADMIN — DEXAMETHASONE SODIUM PHOSPHATE 8 MG: 4 INJECTION, SOLUTION INTRAMUSCULAR; INTRAVENOUS at 07:10

## 2022-10-24 RX ADMIN — ONDANSETRON HYDROCHLORIDE 4 MG: 2 INJECTION INTRAMUSCULAR; INTRAVENOUS at 07:10

## 2022-10-24 RX ADMIN — EPHEDRINE SULFATE 10 MG: 50 INJECTION INTRAVENOUS at 07:10

## 2022-10-24 RX ADMIN — ACETAMINOPHEN 1000 MG: 500 TABLET, FILM COATED ORAL at 05:10

## 2022-10-24 RX ADMIN — MIDAZOLAM HYDROCHLORIDE 2 MG: 1 INJECTION, SOLUTION INTRAMUSCULAR; INTRAVENOUS at 06:10

## 2022-10-24 RX ADMIN — PROPOFOL 160 MG: 10 INJECTION, EMULSION INTRAVENOUS at 07:10

## 2022-10-24 RX ADMIN — KETOROLAC TROMETHAMINE 30 MG: 30 INJECTION, SOLUTION INTRAMUSCULAR; INTRAVENOUS at 07:10

## 2022-10-24 RX ADMIN — SODIUM CHLORIDE, SODIUM LACTATE, POTASSIUM CHLORIDE, AND CALCIUM CHLORIDE: .6; .31; .03; .02 INJECTION, SOLUTION INTRAVENOUS at 06:10

## 2022-10-24 RX ADMIN — DIPHENHYDRAMINE HYDROCHLORIDE 12.5 MG: 50 INJECTION, SOLUTION INTRAMUSCULAR; INTRAVENOUS at 07:10

## 2022-10-24 RX ADMIN — FENTANYL CITRATE 100 MCG: 50 INJECTION, SOLUTION INTRAMUSCULAR; INTRAVENOUS at 07:10

## 2022-10-24 NOTE — DISCHARGE INSTRUCTIONS
Anesthesia: After Your Surgery  Youve just had surgery. During surgery, you received medication called anesthesia to keep you comfortable and pain-free. After surgery, you may experience some pain or nausea. This is common. Here are some tips for feeling better and recovering after surgery.    Going home  Your doctor or nurse will show you how to take care of yourself when you go home. He or she will also answer your questions. Have an adult family member or friend drive you home. For the first 24 hours after your surgery:  Do not drive or use heavy equipment.  Do not make important decisions or sign legal documents.  Avoid alcohol.  Have someone stay with you, if needed. He or she can watch for problems and help keep you safe.  Take your time getting up from a seated or lying position. You may experience dizziness for 24 hours  Be sure to keep all follow-up appointments with your doctor. And rest after your procedure for as long as your doctor tells you to.    Coping with pain  If you have pain after surgery, pain medication will help you feel better. Take it as directed, before pain becomes severe. Also, ask your doctor or pharmacist about other ways to control pain, such as with heat, ice, and relaxation. And follow any other instructions your surgeon or nurse gives you.    URINARY RETENTION  Should you experience a decrease in your urine output or are unable to urinate following surgery, this can be due to the medications given during surgery.  We recommend you going to the nearest Emergency Department.    Tips for taking pain medication  To get the best relief possible, remember these points:  Pain medications can upset your stomach. Taking them with a little food may help.  Most pain relievers taken by mouth need at least 20 to 30 minutes to take effect.  Taking medication on a schedule can help you remember to take it. Try to time your medication so that you can take it before beginning an activity, such  as dressing, walking, or sitting down for dinner.  Constipation is a common side effect of pain medications. Contact your doctor before taking any medications like laxatives or stool softeners to help relieve constipation. Also ask about any dietary restrictions, because drinking lots of fluids and eating foods like fruits and vegetables that are high in fiber can also help. Remember, dont take laxatives unless your surgeon has prescribed them.  Mixing alcohol and pain medication can cause dizziness and slow your breathing. It can even be fatal. Dont drink alcohol while taking pain medication.  Pain medication can slow your reflexes. Dont drive or operate machinery while taking pain medication.  If your health care provider tells you to take acetaminophen to help relieve your pain, ask him or her how much you are supposed to take each day. (Acetaminophen is the generic name for Tylenol and other brand-name pain relievers.) Acetaminophen or other pain relievers may interact with your prescription medicines or other over-the-counter (OTC) drugs. Some prescription medications contain acetaminophen along with other active ingredients. Using both prescription and OTC acetaminophen for pain can cause you to overdose. The FDA recommends that you read the labels on your OTC medications carefully. This will help you to clearly understand the list of active ingredients, dosing instructions, and any warnings. It may also help you avoid taking too much acetaminophen. If you have questions or don't understand the information, ask your pharmacist or health care provider to explain it to you before you take the OTC medication.    Managing nausea  Some people have an upset stomach after surgery. This is often due to anesthesia, pain, pain medications, or the stress of surgery. The following tips will help you manage nausea and get good nutrition as you recover. If you were on a special diet before surgery, ask your doctor if you  should follow it during recovery. These tips may help:  Dont push yourself to eat. Your body will tell you when to eat and how much.  Start off with clear liquids and soup. They are easier to digest.  Progress to semi-solid foods (mashed potatoes, applesauce, and gelatin) as you feel ready.  Slowly move to solid foods. Dont eat fatty, rich, or spicy foods at first.  Dont force yourself to have three large meals a day. Instead, eat smaller amounts more often.  Take pain medications with a small amount of solid food, such as crackers or toast to avoid nausea.      Call your surgeon if    You feel too sleepy, dizzy, or groggy (medication may be too strong).  You have side effects like nausea, vomiting, or skin changes (rash, itching, or hives).   © 3858-3567 [employer]. [address] [place] [address]. All rights reserved. This information is not intended as a substitute for professional medical care. Always follow your healthcare professional's instructions.               Home Care Instruction D&C Hysteroscopy             ACTIVITY LEVEL:  If you received sedation and/or an anesthetic, you may feel sleepy for several hours. Rest until you feel more  awake. Gradually resume your normal activities.    DIET:  At home, continue with liquids. If there is no nausea, you may eat a soft diet and gradually resume a regular diet.    BATHING:  You may shower  as desired in one day.  You should avoid tub baths, hot tubs and swimming pools until seen by your physician for a post-op follow up.    CARE:  You can expect watery or bloody vaginal discharge for several days. Do not use tampons, please only use pads. Sexual activity is restricted as advised by your doctor.    MEDICATIONS:  You will receive instructions for any pain and/or antibiotic prescriptions. Pain medication should be taken only if needed and as directed. Antibiotics, if ordered, should be taken as directed until the entire prescription  is completed.    ADDITIONAL INFORMATION:  __________________________________________________________________________________________  WHEN TO CALL THE DOCTOR:   For any heavy vaginal bleeding   Fever over 101°F (38.4°C)   Any lower abdominal pain not relieved by the pain mediation

## 2022-10-24 NOTE — PLAN OF CARE
Carlospatjac Pierre has met all discharge criteria from Phase II. Vital Signs are stable, ambulating  without difficulty. Discharge instructions given, patient verbalized understanding. Discharged from facility via wheelchair in stable condition.

## 2022-10-24 NOTE — TRANSFER OF CARE
"Anesthesia Transfer of Care Note    Patient: Larissa Pierre    Procedure(s) Performed: Procedure(s) (LRB):  HYSTEROSCOPY, WITH DILATION AND CURETTAGE OF UTERUS (N/A)  ABLATION, ENDOMETRIUM (N/A)    Patient location: PACU    Anesthesia Type: general    Transport from OR: Transported from OR on 2-3 L/min O2 by NC with adequate spontaneous ventilation    Post pain: adequate analgesia    Post assessment: no apparent anesthetic complications    Post vital signs: stable    Level of consciousness: awake    Nausea/Vomiting: no nausea/vomiting    Complications: none    Transfer of care protocol was followed      Last vitals:   Visit Vitals  /61 (BP Location: Right arm, Patient Position: Lying)   Pulse 106   Temp 36.8 °C (98.2 °F) (Oral)   Resp 18   Ht 5' 3" (1.6 m)   Wt 74.8 kg (165 lb)   LMP 09/28/2022 (Approximate)   SpO2 100%   Breastfeeding No   BMI 29.23 kg/m²     "

## 2022-10-24 NOTE — ANESTHESIA PROCEDURE NOTES
Intubation    Date/Time: 10/24/2022 7:10 AM  Performed by: Tammy Zuluaga CRNA  Authorized by: Rodo Peñaloza MD     Intubation:     Induction:  Inhalational - mask    Intubated:  Postinduction    Mask Ventilation:  Easy mask    Attempts:  1    Attempted By:  CRNA    Method of Intubation:  Fast track LMA    Difficult Airway Encountered?: No      Complications:  None    Airway Device:  Supraglottic airway/LMA    Airway Device Size:  4.0    Style/Cuff Inflation:  Cuffed    Secured at:  The lips    Placement Verified By:  Capnometry    Complicating Factors:  None

## 2022-10-24 NOTE — OP NOTE
Operative Report     Date of Surgery:  10/24/2022     Surgeon: Nayana Soares MD     Assistant: Isidra Askew     Pre-Op Diagnosis:   1. AUB    Post-op Diagnosis:  same    Procedure:  1. Dilation and Curettage  2. Endometrial ablation     Findings:   FINDINGS:   1. Normal appearing external genitalia.  2. Normal appearing vaginal and cervical mucosa, free of lesions.  3. Single-toothed tenaculum applied to the anterior lip of the cervix  4. Uterus sounded to 7 cm, Uterine cavity length 4 cm, Uterine width 2.9 cm.  5. Endometrial curettage with #1 curette; scrapings sent to pathology.  6. Hysteroscopically, normal-appearing endometrium.  7. NovaSure inserted, seal tested, electricity applied at power 64W for 1:01. NovaSure removed.   8. Hysteroscopically, no damage to the endocervical canal, uniform ablation to the uterine cavity; no evidence of perforation.  9. Tenaculum removed, tenaculum puncture sites with mild bleeding well-controlled after applying pressure with the sponge stick.    EBL: 10CC      IVF: see anesthesia report     Urine Output: 20 cc     NOVASURE FLUID IN: 129 cc  NOVASURE FLUID DEFICIT: 90 cc   NOVASURE CAVITY MEASUREMENTS: 4 cm (L) x 2.9 cm (W)  NOVASURE POWER: 64W  NOVASURE TIME: 1:01      Specimen: endometrial curettage - sent to pathology     Procedure in Detail:  The patient was taken to the Operating Room where general anesthesia was obtained, the patient was placed in the lithotomy position, with her legs in the Yellow fin stirrups. The patient was then prepped and draped in the normal sterile fashion and her bladder was drained using in-and-out catheterization. The weighted speculum was placed in the posterior aspect of the vagina and the right angle retractor was placed in the anterior aspect of the vagina.  The cervix was visualized and a single-tooth tenaculum was placed on the anterior aspect of the cervix. The uterus sounded to 7 cm. The cervix was dilated with Sarabjit dilators to  allow introduction of the 5mm hysteroscope. The hysteroscope was then white balanced and the line cleared of air. The hysteroscope was then inserted into the cervical os and the uterine cavity was directly visualized, as well as both ostia.  Normal-appearing uterine findings.     Curettage was then performed to remove endometrial tissue for sampling, and sent to pathology.  The Novasure system was placed into the endometrial cavity, the width was measured, and the device was removed from the uterus. The cavity width and length were then applied to the Novasure system.  The Novasure was then placed back into the endometrial cavity to the uterine fundus, pulled back 0.5 cm from the fundus, and array was then deployed.  The cavity assessment was performed and was passed. The ablation was then conducted. Following removal of the Novasure device, the cavity was again inspected with a thorough burn noted. The single tooth tenaculum was then removed and the cervix was noted to be hemostatic.     Sponge, lap,and instrument counts were correct x 2.  The patient tolerated the procedure well and was taken to recovery in stable condition.        Isidra Askew MD   OB/GYN PGY1  [facility]

## 2022-10-24 NOTE — DISCHARGE SUMMARY
[facility]  Brief Op Note/Discharge Note  Short Stay    Admit Date: 10/24/2022    Discharge Date: 10/24/2022    Attending Physician: Nayana Soares MD     Surgery Date: 10/24/2022     Surgeon(s) and Role:     * Nayana Soares MD - Primary    Assisting Surgeon:  Isidra Askew, PGY1    Pre-op Diagnosis:  Metrorrhagia [N92.1]    Post-op Diagnosis:  Post-Op Diagnosis Codes:     * Metrorrhagia [N92.1]    Procedure(s) (LRB):  HYSTEROSCOPY, WITH DILATION AND CURETTAGE OF UTERUS (N/A)  ABLATION, ENDOMETRIUM (N/A)    Anesthesia: General    Findings/Key Components:   FINDINGS:   1. Normal appearing external genitalia.  2. Normal appearing vaginal and cervical mucosa, free of lesions.  3. Single-toothed tenaculum applied to the anterior lip of the cervix  4. Uterus sounded to 7 cm, Uterine cavity length 4 cm, Uterine width 2.9 cm.  5. Endometrial curettage with #1 curette; scrapings sent to pathology.  6. Hysteroscopically, normal-appearing endometrium.  7. NovaSure inserted, seal tested, electricity applied at power 64W for 1:01. NovaSure removed.   8. Hysteroscopically, no damage to the endocervical canal, uniform ablation to the uterine cavity; no evidence of perforation.  9. Tenaculum removed, tenaculum puncture sites with mild bleeding well-controlled after applying pressure with the sponge stick.    Estimated Blood Loss: 10cc         Specimens:   Specimen (24h ago, onward)       Start     Ordered    10/24/22 0740  Specimen to Pathology, Surgery Gynecology and Obstetrics  Once        Comments: Pre-op Diagnosis: Metrorrhagia [N92.1]Procedure(s):HYSTEROSCOPY, WITH DILATION AND CURETTAGE OF UTERUSABLATION, ENDOMETRIUM Number of specimens: 1Name of specimens: 1. Endometrial curettings     References:    Click here for ordering Quick Tip   Question Answer Comment   Procedure Type: Gynecology and Obstetrics    Specimen Class: Routine/Screening    Which provider would you like to cc? NAYANA SOARES  Nayana Soares   Release to patient Immediate        10/24/22 0752                    Discharge Provider: Isidra Askew    Diagnoses:  Active Hospital Problems    Diagnosis  POA    *S/P endometrial ablation 10/24/2022 [Z98.890]  Not Applicable      Resolved Hospital Problems   No resolved problems to display.       Discharged Condition: good    Hospital Course:   Patient was admitted for outpatient procedure as above, and tolerated the procedure well with no complications. Please see operative report for further details. Following the procedure, the patient was awakened from anesthesia and transferred to the recovery area in stable condition. She was discharged to home once ambulating, voiding, tolerating PO intake, and pain was well-controlled. Patient was given routine post-op instructions and prescriptions for pain medication to take as needed. Patient instructed to follow up with Dr. Soares as needed.    Final Diagnoses: Same as principal problem.    Disposition: Home or Self Care    Follow up/Patient Instructions:    Medications:  Reconciled Home Medications:      Medication List        START taking these medications      ibuprofen 600 MG tablet  Commonly known as: ADVIL,MOTRIN  Take 1 tablet (600 mg total) by mouth every 6 (six) hours as needed for Pain.     oxyCODONE 5 MG immediate release tablet  Commonly known as: ROXICODONE  Take 1 tablet (5 mg total) by mouth every 4 (four) hours as needed for Pain.            CHANGE how you take these medications      spironolactone 50 MG tablet  Commonly known as: ALDACTONE  Take 1 tablet (50 mg total) by mouth once daily.  What changed:   medication strength  how much to take            STOP taking these medications      drospirenone-ethinyl estradioL 3-0.02 mg per tablet  Commonly known as: KING     FINACEA 15 % Foam  Generic drug: azelaic acid     ondansetron 8 MG tablet  Commonly known as: ZOFRAN     SEMAGLUTIDE SUBQ     UNABLE TO FIND     VITAMIN C ORAL             Discharge Procedure Orders   Diet Adult Regular     No driving until:   Order Comments: No driving until no longer taking pain medication and feels comfortable stepping on the brake.     Pelvic Rest   Order Comments: Nothing in vagina including intercourse for 6 weeks.     Notify your health care provider if you experience any of the following:  temperature >100.4     Notify your health care provider if you experience any of the following:  persistent nausea and vomiting or diarrhea     Notify your health care provider if you experience any of the following:  severe uncontrolled pain     Notify your health care provider if you experience any of the following:  severe persistent headache     Notify your health care provider if you experience any of the following:  persistent dizziness, light-headedness, or visual disturbances     Notify your health care provider if you experience any of the following:  increased confusion or weakness     Notify your health care provider if you experience any of the following:   Order Comments: Heavy vaginal bleeding, saturating more than 2 pads in 1 hour.     Activity as tolerated           Isidra Askew MD   OB/GYN PGY1  [facility]

## 2022-10-24 NOTE — ANESTHESIA POSTPROCEDURE EVALUATION
Anesthesia Post Evaluation    Patient: Larissa Pierre    Procedure(s) Performed: Procedure(s) (LRB):  HYSTEROSCOPY, WITH DILATION AND CURETTAGE OF UTERUS (N/A)  ABLATION, ENDOMETRIUM (N/A)    Final Anesthesia Type: general      Patient location during evaluation: PACU  Patient participation: Yes- Able to Participate  Level of consciousness: awake and alert  Post-procedure vital signs: reviewed and stable  Pain management: adequate  Airway patency: patent    PONV status at discharge: No PONV  Anesthetic complications: no      Cardiovascular status: blood pressure returned to baseline  Respiratory status: unassisted and spontaneous ventilation  Hydration status: euvolemic  Follow-up not needed.          Vitals Value Taken Time   /64 10/24/22 0915   Temp 36.7 °C (98 °F) 10/24/22 0830   Pulse 84 10/24/22 0915   Resp 16 10/24/22 0915   SpO2 100 % 10/24/22 0915         Event Time   Out of Recovery 08:42:39         Pain/Cherelle Score: Pain Rating Prior to Med Admin: 4 (10/24/2022  8:03 AM)  Cherelle Score: 10 (10/24/2022  9:15 AM)
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
## 2022-10-25 ENCOUNTER — PATIENT MESSAGE (OUTPATIENT)
Dept: OBSTETRICS AND GYNECOLOGY | Facility: CLINIC | Age: 32
End: 2022-10-25
Payer: COMMERCIAL

## 2022-10-25 VITALS
BODY MASS INDEX: 29.23 KG/M2 | RESPIRATION RATE: 16 BRPM | TEMPERATURE: 98 F | WEIGHT: 165 LBS | SYSTOLIC BLOOD PRESSURE: 118 MMHG | OXYGEN SATURATION: 100 % | DIASTOLIC BLOOD PRESSURE: 64 MMHG | HEART RATE: 84 BPM | HEIGHT: 63 IN

## 2022-10-31 LAB
FINAL PATHOLOGIC DIAGNOSIS: NORMAL
GROSS: NORMAL
Lab: NORMAL

## 2023-03-08 ENCOUNTER — PATIENT MESSAGE (OUTPATIENT)
Dept: OBSTETRICS AND GYNECOLOGY | Facility: CLINIC | Age: 33
End: 2023-03-08
Payer: COMMERCIAL

## 2023-07-25 ENCOUNTER — PATIENT MESSAGE (OUTPATIENT)
Dept: OBSTETRICS AND GYNECOLOGY | Facility: CLINIC | Age: 33
End: 2023-07-25
Payer: COMMERCIAL

## 2024-01-17 ENCOUNTER — PATIENT MESSAGE (OUTPATIENT)
Dept: OBSTETRICS AND GYNECOLOGY | Facility: CLINIC | Age: 34
End: 2024-01-17
Payer: COMMERCIAL

## 2024-01-17 RX ORDER — FLUCONAZOLE 150 MG/1
150 TABLET ORAL DAILY
Qty: 2 TABLET | Refills: 1 | Status: SHIPPED | OUTPATIENT
Start: 2024-01-17

## 2024-01-22 ENCOUNTER — OFFICE VISIT (OUTPATIENT)
Dept: OBSTETRICS AND GYNECOLOGY | Facility: CLINIC | Age: 34
End: 2024-01-22
Payer: COMMERCIAL

## 2024-01-22 VITALS
WEIGHT: 146.19 LBS | SYSTOLIC BLOOD PRESSURE: 104 MMHG | HEIGHT: 63 IN | DIASTOLIC BLOOD PRESSURE: 74 MMHG | BODY MASS INDEX: 25.9 KG/M2

## 2024-01-22 DIAGNOSIS — Z11.51 ENCOUNTER FOR SCREENING FOR HUMAN PAPILLOMAVIRUS (HPV): ICD-10-CM

## 2024-01-22 DIAGNOSIS — Z01.419 ENCOUNTER FOR GYNECOLOGICAL EXAMINATION: Primary | ICD-10-CM

## 2024-01-22 DIAGNOSIS — Z12.4 PAP SMEAR FOR CERVICAL CANCER SCREENING: ICD-10-CM

## 2024-01-22 PROCEDURE — 99999 PR PBB SHADOW E&M-EST. PATIENT-LVL III: CPT | Mod: PBBFAC,,, | Performed by: OBSTETRICS & GYNECOLOGY

## 2024-01-22 PROCEDURE — 87624 HPV HI-RISK TYP POOLED RSLT: CPT | Performed by: OBSTETRICS & GYNECOLOGY

## 2024-01-22 PROCEDURE — 99395 PREV VISIT EST AGE 18-39: CPT | Mod: S$GLB,,, | Performed by: OBSTETRICS & GYNECOLOGY

## 2024-01-22 PROCEDURE — 88175 CYTOPATH C/V AUTO FLUID REDO: CPT | Performed by: OBSTETRICS & GYNECOLOGY

## 2024-01-22 RX ORDER — ISOTRETINOIN 40 MG/1
40 CAPSULE, GELATIN COATED ORAL 2 TIMES DAILY
COMMUNITY
Start: 2023-12-11

## 2024-01-22 RX ORDER — TRETINOIN 0.25 MG/G
CREAM TOPICAL
COMMUNITY
Start: 2023-09-06

## 2024-01-22 RX ORDER — ONDANSETRON 4 MG/1
4 TABLET, FILM COATED ORAL EVERY 8 HOURS PRN
COMMUNITY
Start: 2023-10-02

## 2024-01-22 RX ORDER — HYDROCORTISONE 25 MG/G
OINTMENT TOPICAL 2 TIMES DAILY
COMMUNITY
Start: 2023-09-06

## 2024-01-22 RX ORDER — NITROFURANTOIN 25; 75 MG/1; MG/1
100 CAPSULE ORAL 2 TIMES DAILY
COMMUNITY
Start: 2023-08-01

## 2024-01-22 RX ORDER — SPIRONOLACTONE 100 MG/1
100 TABLET, FILM COATED ORAL
COMMUNITY
Start: 2023-12-30

## 2024-01-22 RX ORDER — ONDANSETRON 8 MG/1
8 TABLET, ORALLY DISINTEGRATING ORAL 2 TIMES DAILY PRN
COMMUNITY
Start: 2023-12-28

## 2024-01-22 RX ORDER — NEOMYCIN SULFATE, POLYMYXIN B SULFATE AND DEXAMETHASONE 3.5; 10000; 1 MG/ML; [USP'U]/ML; MG/ML
1 SUSPENSION/ DROPS OPHTHALMIC 3 TIMES DAILY
COMMUNITY
Start: 2023-10-09

## 2024-01-22 RX ORDER — NEOMYCIN SULFATE, POLYMYXIN B SULFATE AND HYDROCORTISONE 3.5; 10000; 1 MG/ML; [USP'U]/ML; MG/ML
1 SUSPENSION OPHTHALMIC 3 TIMES DAILY
COMMUNITY
Start: 2023-10-09

## 2024-01-22 NOTE — PROGRESS NOTES
HPI: Pt is a 33 y.o.  female who presents for routine annual exam. She uses vasectomy for contraception. She does not want STD screening. Hx of endometrial ablation in 10/2022. Very few periods since then - mostly spotting. No gyn complaints.     Pap/HPV 10/2020 NORMAL.       ROS:  GENERAL: Feeling well overall. Denies fever or chills.   SKIN: Denies rash or lesions.   HEAD: Denies head injury or headache.   NODES: Denies enlarged lymph nodes.   CHEST: Denies chest pain or shortness of breath.   CARDIOVASCULAR: Denies palpitations or left sided chest pain.   ABDOMEN: No abdominal pain, constipation, diarrhea, nausea or vomiting   URINARY: No dysuria, hematuria, or burning on urination.  REPRODUCTIVE: See HPI.   BREASTS: Denies pain, lumps, or nipple discharge.   HEMATOLOGIC: No easy bruisability or excessive bleeding.   MUSCULOSKELETAL: Denies joint pain or swelling.   NEUROLOGIC: Denies syncope or weakness.   PSYCHIATRIC: Denies acute depression or anxiety     PE:   APPEARANCE: Well nourished, well developed, friendly White female in no acute distress.  NODES: no inguinal lymphadenopathy  BREASTS: Symmetrical, no skin changes or visible lesions. No palpable masses, nipple discharge or adenopathy bilaterally.  ABDOMEN: Soft. No tenderness or masses. No distention.   VULVA: No lesions. Normal external female genitalia.  URETHRAL MEATUS: Normal size and location, no lesions, no prolapse.  URETHRA: No masses, tenderness, or prolapse.  VAGINA: Moist. No lesions or lacerations noted. No abnormal discharge present. No odor present.   CERVIX: No lesions or discharge. No cervical motion tenderness.   UTERUS: Normal size, regular shape, mobile, non-tender.  ADNEXA: No tenderness. No fullness or masses palpated in the adnexal regions.   ANUS PERINEUM: Normal.      Diagnosis:  1. Encounter for gynecological examination    2. Pap smear for cervical cancer screening    3. Encounter for screening for human papillomavirus (HPV)         Plan:     Orders Placed This Encounter    HPV High Risk Genotypes, PCR    Liquid-Based Pap Smear, Screening       Follow-up with me in 1 year for routine exam; pap/HPV in 3-5 years

## 2024-01-24 LAB
FINAL PATHOLOGIC DIAGNOSIS: NORMAL
HPV HR 12 DNA SPEC QL NAA+PROBE: NEGATIVE
HPV16 AG SPEC QL: NEGATIVE
HPV18 DNA SPEC QL NAA+PROBE: NEGATIVE
Lab: NORMAL

## 2024-11-07 DIAGNOSIS — F41.9 ANXIETY: Primary | ICD-10-CM

## 2024-11-07 RX ORDER — ALPRAZOLAM 0.25 MG/1
0.25 TABLET ORAL 2 TIMES DAILY PRN
Qty: 30 TABLET | Refills: 0 | Status: SHIPPED | OUTPATIENT
Start: 2024-11-07

## 2024-11-07 RX ORDER — RISANKIZUMAB-RZAA 150 MG/ML
INJECTION SUBCUTANEOUS
COMMUNITY
Start: 2024-10-11

## 2024-11-07 NOTE — TELEPHONE ENCOUNTER
----- Message from Andrez sent at 11/7/2024 10:55 AM CST -----  Regarding: Self [phone]  Type: Patient Call Back    Who called: Self     What is the request in detail: called stating she would like to speak to the nurse for the doctor about her medications.     Can the clinic reply by [facility]? No     Would the patient rather a call back or a response via [facility]? Call back     Best call back number: [phone]     Additional Information:    Thank you.

## 2024-11-07 NOTE — TELEPHONE ENCOUNTER
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

## 2025-02-14 ENCOUNTER — ANESTHESIA EVENT (OUTPATIENT)
Dept: SURGERY | Facility: OTHER | Age: 35
End: 2025-02-14
Payer: COMMERCIAL

## 2025-03-10 NOTE — PRE-PROCEDURE INSTRUCTIONS
Pre admit phone call completed.    Instructions given to patient about NPO status as follows:     The evening before surgery do not eat anything after 9 p.m. ( this includes hard candy, chewing gum and mints).  You may only have GATORADE, POWERADE AND WATER from 9 p.m. until you leave your home. DO NOT  DRINK ANY LIQUIDS ON THE WAY TO THE HOSPITAL.      Patient was also instructed on the below information:    Park in the Parking lot behind the hospital or in the [facility] across the street from the parking lot.  Parking is complimentary.  If you will be discharged the same day as your procedure, please arrange for a responsible adult to drive you home or  to accompany you if traveling by taxi.  YOU WILL NOT BE PERMITTED TO DRIVE OR TO LEAVE THE HOSPITAL ALONE AFTER SURGERY.  It is strongly recommended that you arrange for someone to remain with you for the first 24 hrs following your surgery.    Patient verbalized understanding of above instructions.

## 2025-03-17 RX ORDER — SODIUM CHLORIDE, SODIUM LACTATE, POTASSIUM CHLORIDE, CALCIUM CHLORIDE 600; 310; 30; 20 MG/100ML; MG/100ML; MG/100ML; MG/100ML
INJECTION, SOLUTION INTRAVENOUS CONTINUOUS
Status: CANCELLED | OUTPATIENT
Start: 2025-03-17

## 2025-03-17 RX ORDER — LIDOCAINE HYDROCHLORIDE 10 MG/ML
0.5 INJECTION, SOLUTION EPIDURAL; INFILTRATION; INTRACAUDAL; PERINEURAL ONCE
Status: CANCELLED | OUTPATIENT
Start: 2025-03-17 | End: 2025-03-17

## 2025-03-18 ENCOUNTER — ANESTHESIA (OUTPATIENT)
Dept: SURGERY | Facility: OTHER | Age: 35
End: 2025-03-18
Payer: COMMERCIAL

## 2025-03-18 ENCOUNTER — HOSPITAL ENCOUNTER (OUTPATIENT)
Facility: OTHER | Age: 35
Discharge: HOME | End: 2025-03-18
Attending: PLASTIC SURGERY | Admitting: PLASTIC SURGERY
Payer: COMMERCIAL

## 2025-03-18 DIAGNOSIS — Z01.818 PRE-OP TESTING: ICD-10-CM

## 2025-03-18 DIAGNOSIS — Z41.1 ELECTIVE PROCEDURE FOR UNACCEPTABLE COSMETIC APPEARANCE: Primary | ICD-10-CM

## 2025-03-18 LAB
ANION GAP SERPL CALC-SCNC: 8 MMOL/L (ref 8–16)
B-HCG UR QL: NEGATIVE
BASOPHILS # BLD AUTO: 0.04 K/UL (ref 0–0.2)
BASOPHILS NFR BLD: 1 % (ref 0–1.9)
BUN SERPL-MCNC: 12 MG/DL (ref 6–20)
CALCIUM SERPL-MCNC: 9.1 MG/DL (ref 8.7–10.5)
CHLORIDE SERPL-SCNC: 107 MMOL/L (ref 95–110)
CO2 SERPL-SCNC: 23 MMOL/L (ref 23–29)
CREAT SERPL-MCNC: 0.7 MG/DL (ref 0.5–1.4)
CTP QC/QA: YES
DIFFERENTIAL METHOD BLD: ABNORMAL
EOSINOPHIL # BLD AUTO: 0.1 K/UL (ref 0–0.5)
EOSINOPHIL NFR BLD: 1.3 % (ref 0–8)
ERYTHROCYTE [DISTWIDTH] IN BLOOD BY AUTOMATED COUNT: 12.3 % (ref 11.5–14.5)
EST. GFR  (NO RACE VARIABLE): >60 ML/MIN/1.73 M^2
GLUCOSE SERPL-MCNC: 86 MG/DL (ref 70–110)
HCT VFR BLD AUTO: 40.5 % (ref 37–48.5)
HGB BLD-MCNC: 13.7 G/DL (ref 12–16)
IMM GRANULOCYTES # BLD AUTO: 0.01 K/UL (ref 0–0.04)
IMM GRANULOCYTES NFR BLD AUTO: 0.3 % (ref 0–0.5)
LYMPHOCYTES # BLD AUTO: 1.4 K/UL (ref 1–4.8)
LYMPHOCYTES NFR BLD: 36.3 % (ref 18–48)
MCH RBC QN AUTO: 31.1 PG (ref 27–31)
MCHC RBC AUTO-ENTMCNC: 33.8 G/DL (ref 32–36)
MCV RBC AUTO: 92 FL (ref 82–98)
MONOCYTES # BLD AUTO: 0.4 K/UL (ref 0.3–1)
MONOCYTES NFR BLD: 9 % (ref 4–15)
NEUTROPHILS # BLD AUTO: 2 K/UL (ref 1.8–7.7)
NEUTROPHILS NFR BLD: 52.1 % (ref 38–73)
NRBC BLD-RTO: 0 /100 WBC
PLATELET # BLD AUTO: 270 K/UL (ref 150–450)
PMV BLD AUTO: 9.6 FL (ref 9.2–12.9)
POTASSIUM SERPL-SCNC: 4.2 MMOL/L (ref 3.5–5.1)
RBC # BLD AUTO: 4.41 M/UL (ref 4–5.4)
SODIUM SERPL-SCNC: 138 MMOL/L (ref 136–145)
WBC # BLD AUTO: 3.91 K/UL (ref 3.9–12.7)

## 2025-03-18 PROCEDURE — 37000008 HC ANESTHESIA 1ST 15 MINUTES: Performed by: PLASTIC SURGERY

## 2025-03-18 PROCEDURE — C1789 PROSTHESIS, BREAST, IMP: HCPCS | Performed by: PLASTIC SURGERY

## 2025-03-18 PROCEDURE — 25000003 PHARM REV CODE 250: Performed by: NURSE ANESTHETIST, CERTIFIED REGISTERED

## 2025-03-18 PROCEDURE — 71000015 HC POSTOP RECOV 1ST HR: Performed by: PLASTIC SURGERY

## 2025-03-18 PROCEDURE — 81025 URINE PREGNANCY TEST: CPT | Performed by: ANESTHESIOLOGY

## 2025-03-18 PROCEDURE — 36415 COLL VENOUS BLD VENIPUNCTURE: CPT | Performed by: ANESTHESIOLOGY

## 2025-03-18 PROCEDURE — 63600175 PHARM REV CODE 636 W HCPCS: Performed by: PLASTIC SURGERY

## 2025-03-18 PROCEDURE — 71000016 HC POSTOP RECOV ADDL HR: Performed by: PLASTIC SURGERY

## 2025-03-18 PROCEDURE — 88300 SURGICAL PATH GROSS: CPT | Performed by: PATHOLOGY

## 2025-03-18 PROCEDURE — 37000009 HC ANESTHESIA EA ADD 15 MINS: Performed by: PLASTIC SURGERY

## 2025-03-18 PROCEDURE — 63600175 PHARM REV CODE 636 W HCPCS: Performed by: NURSE ANESTHETIST, CERTIFIED REGISTERED

## 2025-03-18 PROCEDURE — 71000039 HC RECOVERY, EACH ADD'L HOUR: Performed by: PLASTIC SURGERY

## 2025-03-18 PROCEDURE — 88300 SURGICAL PATH GROSS: CPT | Mod: 26,,, | Performed by: PATHOLOGY

## 2025-03-18 PROCEDURE — 25000003 PHARM REV CODE 250: Performed by: ANESTHESIOLOGY

## 2025-03-18 PROCEDURE — 85025 COMPLETE CBC W/AUTO DIFF WBC: CPT | Performed by: ANESTHESIOLOGY

## 2025-03-18 PROCEDURE — 36000706: Performed by: PLASTIC SURGERY

## 2025-03-18 PROCEDURE — 71000033 HC RECOVERY, INTIAL HOUR: Performed by: PLASTIC SURGERY

## 2025-03-18 PROCEDURE — 36000707: Performed by: PLASTIC SURGERY

## 2025-03-18 PROCEDURE — 25000003 PHARM REV CODE 250: Performed by: PLASTIC SURGERY

## 2025-03-18 PROCEDURE — 80048 BASIC METABOLIC PNL TOTAL CA: CPT | Performed by: ANESTHESIOLOGY

## 2025-03-18 RX ORDER — TRANEXAMIC ACID 100 MG/ML
INJECTION, SOLUTION INTRAVENOUS
Status: DISCONTINUED | OUTPATIENT
Start: 2025-03-18 | End: 2025-03-18

## 2025-03-18 RX ORDER — PROPOFOL 10 MG/ML
VIAL (ML) INTRAVENOUS
Status: DISCONTINUED | OUTPATIENT
Start: 2025-03-18 | End: 2025-03-18

## 2025-03-18 RX ORDER — CEPHALEXIN 500 MG/1
500 CAPSULE ORAL EVERY 6 HOURS
Qty: 28 CAPSULE | Refills: 0 | Status: SHIPPED | OUTPATIENT
Start: 2025-03-18 | End: 2025-03-25

## 2025-03-18 RX ORDER — HYDROMORPHONE HYDROCHLORIDE 2 MG/ML
0.4 INJECTION, SOLUTION INTRAMUSCULAR; INTRAVENOUS; SUBCUTANEOUS EVERY 5 MIN PRN
Status: DISCONTINUED | OUTPATIENT
Start: 2025-03-18 | End: 2025-03-18 | Stop reason: HOSPADM

## 2025-03-18 RX ORDER — FENTANYL CITRATE 50 UG/ML
INJECTION, SOLUTION INTRAMUSCULAR; INTRAVENOUS
Status: DISCONTINUED | OUTPATIENT
Start: 2025-03-18 | End: 2025-03-18

## 2025-03-18 RX ORDER — DEXAMETHASONE SODIUM PHOSPHATE 4 MG/ML
INJECTION, SOLUTION INTRA-ARTICULAR; INTRALESIONAL; INTRAMUSCULAR; INTRAVENOUS; SOFT TISSUE
Status: DISCONTINUED | OUTPATIENT
Start: 2025-03-18 | End: 2025-03-18

## 2025-03-18 RX ORDER — PROCHLORPERAZINE EDISYLATE 5 MG/ML
5 INJECTION INTRAMUSCULAR; INTRAVENOUS EVERY 30 MIN PRN
Status: DISCONTINUED | OUTPATIENT
Start: 2025-03-18 | End: 2025-03-18 | Stop reason: HOSPADM

## 2025-03-18 RX ORDER — GLUCAGON 1 MG
1 KIT INJECTION
Status: DISCONTINUED | OUTPATIENT
Start: 2025-03-18 | End: 2025-03-18 | Stop reason: HOSPADM

## 2025-03-18 RX ORDER — LIDOCAINE HYDROCHLORIDE 10 MG/ML
0.5 INJECTION, SOLUTION EPIDURAL; INFILTRATION; INTRACAUDAL; PERINEURAL ONCE
Status: DISCONTINUED | OUTPATIENT
Start: 2025-03-18 | End: 2025-03-18 | Stop reason: HOSPADM

## 2025-03-18 RX ORDER — HALOPERIDOL LACTATE 5 MG/ML
INJECTION, SOLUTION INTRAMUSCULAR
Status: DISCONTINUED | OUTPATIENT
Start: 2025-03-18 | End: 2025-03-18

## 2025-03-18 RX ORDER — MIDAZOLAM HYDROCHLORIDE 1 MG/ML
INJECTION INTRAMUSCULAR; INTRAVENOUS
Status: DISCONTINUED | OUTPATIENT
Start: 2025-03-18 | End: 2025-03-18

## 2025-03-18 RX ORDER — LIDOCAINE HYDROCHLORIDE 20 MG/ML
INJECTION INTRAVENOUS
Status: DISCONTINUED | OUTPATIENT
Start: 2025-03-18 | End: 2025-03-18

## 2025-03-18 RX ORDER — ONDANSETRON HYDROCHLORIDE 2 MG/ML
INJECTION, SOLUTION INTRAVENOUS
Status: DISCONTINUED | OUTPATIENT
Start: 2025-03-18 | End: 2025-03-18

## 2025-03-18 RX ORDER — OXYCODONE HYDROCHLORIDE 5 MG/1
5 TABLET ORAL EVERY 4 HOURS PRN
Status: DISCONTINUED | OUTPATIENT
Start: 2025-03-18 | End: 2025-03-18 | Stop reason: HOSPADM

## 2025-03-18 RX ORDER — CEFAZOLIN SODIUM 1 G/3ML
2 INJECTION, POWDER, FOR SOLUTION INTRAMUSCULAR; INTRAVENOUS
Status: COMPLETED | OUTPATIENT
Start: 2025-03-18 | End: 2025-03-18

## 2025-03-18 RX ORDER — SODIUM CHLORIDE, SODIUM LACTATE, POTASSIUM CHLORIDE, CALCIUM CHLORIDE 600; 310; 30; 20 MG/100ML; MG/100ML; MG/100ML; MG/100ML
INJECTION, SOLUTION INTRAVENOUS CONTINUOUS
Status: DISCONTINUED | OUTPATIENT
Start: 2025-03-18 | End: 2025-03-18 | Stop reason: HOSPADM

## 2025-03-18 RX ORDER — DIPHENHYDRAMINE HYDROCHLORIDE 50 MG/ML
12.5 INJECTION, SOLUTION INTRAMUSCULAR; INTRAVENOUS EVERY 30 MIN PRN
Status: DISCONTINUED | OUTPATIENT
Start: 2025-03-18 | End: 2025-03-18 | Stop reason: HOSPADM

## 2025-03-18 RX ORDER — KETAMINE HCL IN 0.9 % NACL 50 MG/5 ML
SYRINGE (ML) INTRAVENOUS
Status: DISCONTINUED | OUTPATIENT
Start: 2025-03-18 | End: 2025-03-18

## 2025-03-18 RX ORDER — SODIUM CHLORIDE 0.9 % (FLUSH) 0.9 %
3 SYRINGE (ML) INJECTION
Status: DISCONTINUED | OUTPATIENT
Start: 2025-03-18 | End: 2025-03-18 | Stop reason: HOSPADM

## 2025-03-18 RX ORDER — PROPOFOL 10 MG/ML
VIAL (ML) INTRAVENOUS CONTINUOUS PRN
Status: DISCONTINUED | OUTPATIENT
Start: 2025-03-18 | End: 2025-03-18

## 2025-03-18 RX ORDER — ROCURONIUM BROMIDE 10 MG/ML
INJECTION, SOLUTION INTRAVENOUS
Status: DISCONTINUED | OUTPATIENT
Start: 2025-03-18 | End: 2025-03-18

## 2025-03-18 RX ORDER — PHENYLEPHRINE HYDROCHLORIDE 10 MG/ML
INJECTION INTRAVENOUS
Status: DISCONTINUED | OUTPATIENT
Start: 2025-03-18 | End: 2025-03-18

## 2025-03-18 RX ORDER — HYDROCODONE BITARTRATE AND ACETAMINOPHEN 5; 325 MG/1; MG/1
1 TABLET ORAL EVERY 4 HOURS PRN
Qty: 30 TABLET | Refills: 0 | Status: SHIPPED | OUTPATIENT
Start: 2025-03-18

## 2025-03-18 RX ORDER — ACETAMINOPHEN 10 MG/ML
INJECTION, SOLUTION INTRAVENOUS
Status: DISCONTINUED | OUTPATIENT
Start: 2025-03-18 | End: 2025-03-18

## 2025-03-18 RX ADMIN — ROCURONIUM BROMIDE 30 MG: 10 SOLUTION INTRAVENOUS at 11:03

## 2025-03-18 RX ADMIN — SODIUM CHLORIDE: 0.9 INJECTION, SOLUTION INTRAVENOUS at 10:03

## 2025-03-18 RX ADMIN — DEXAMETHASONE SODIUM PHOSPHATE 4 MG: 4 INJECTION, SOLUTION INTRAMUSCULAR; INTRAVENOUS at 11:03

## 2025-03-18 RX ADMIN — SUGAMMADEX 200 MG: 100 INJECTION, SOLUTION INTRAVENOUS at 02:03

## 2025-03-18 RX ADMIN — ROCURONIUM BROMIDE 50 MG: 10 SOLUTION INTRAVENOUS at 11:03

## 2025-03-18 RX ADMIN — PHENYLEPHRINE HYDROCHLORIDE 100 MCG: 10 INJECTION INTRAVENOUS at 11:03

## 2025-03-18 RX ADMIN — PROPOFOL 100 MCG/KG/MIN: 10 INJECTION, EMULSION INTRAVENOUS at 11:03

## 2025-03-18 RX ADMIN — TRANEXAMIC ACID 1000 MG: 100 INJECTION, SOLUTION INTRAVENOUS at 11:03

## 2025-03-18 RX ADMIN — Medication 25 MG: at 11:03

## 2025-03-18 RX ADMIN — PROPOFOL 200 MG: 10 INJECTION, EMULSION INTRAVENOUS at 11:03

## 2025-03-18 RX ADMIN — ROCURONIUM BROMIDE 20 MG: 10 SOLUTION INTRAVENOUS at 01:03

## 2025-03-18 RX ADMIN — FENTANYL CITRATE 50 MCG: 50 INJECTION, SOLUTION INTRAMUSCULAR; INTRAVENOUS at 01:03

## 2025-03-18 RX ADMIN — LIDOCAINE HYDROCHLORIDE 50 MG: 20 INJECTION, SOLUTION INTRAVENOUS at 11:03

## 2025-03-18 RX ADMIN — SODIUM CHLORIDE: 0.9 INJECTION, SOLUTION INTRAVENOUS at 12:03

## 2025-03-18 RX ADMIN — FENTANYL CITRATE 50 MCG: 50 INJECTION, SOLUTION INTRAMUSCULAR; INTRAVENOUS at 11:03

## 2025-03-18 RX ADMIN — HALOPERIDOL LACTATE 0.5 MG: 5 INJECTION, SOLUTION INTRAMUSCULAR at 02:03

## 2025-03-18 RX ADMIN — ROCURONIUM BROMIDE 20 MG: 10 SOLUTION INTRAVENOUS at 12:03

## 2025-03-18 RX ADMIN — ACETAMINOPHEN 1000 MG: 10 INJECTION INTRAVENOUS at 01:03

## 2025-03-18 RX ADMIN — ONDANSETRON HYDROCHLORIDE 4 MG: 2 INJECTION INTRAMUSCULAR; INTRAVENOUS at 01:03

## 2025-03-18 RX ADMIN — OXYCODONE HYDROCHLORIDE 5 MG: 5 TABLET ORAL at 02:03

## 2025-03-18 RX ADMIN — FENTANYL CITRATE 50 MCG: 50 INJECTION, SOLUTION INTRAMUSCULAR; INTRAVENOUS at 12:03

## 2025-03-18 RX ADMIN — CEFAZOLIN 2 G: 330 INJECTION, POWDER, FOR SOLUTION INTRAMUSCULAR; INTRAVENOUS at 11:03

## 2025-03-18 RX ADMIN — MIDAZOLAM HYDROCHLORIDE 2 MG: 1 INJECTION, SOLUTION INTRAMUSCULAR; INTRAVENOUS at 10:03

## 2025-03-18 NOTE — ANESTHESIA PREPROCEDURE EVALUATION
03/18/2025  Larissa Pierre is a 34 y.o., female.      Pre-op Assessment    I have reviewed the Patient Summary Reports.     I have reviewed the Nursing Notes. I have reviewed the NPO Status.   I have reviewed the Medications.     Review of Systems  Anesthesia Hx:             Denies Family Hx of Anesthesia complications.    Denies Personal Hx of Anesthesia complications.                    Social:  Non-Smoker       Hematology/Oncology:  Hematology Normal   Oncology Normal                                   EENT/Dental:  chronic allergic rhinitis           Cardiovascular:  Cardiovascular Normal                                              Renal/:  Renal/ Normal                 Hepatic/GI:  Hepatic/GI Normal                    Musculoskeletal:  Musculoskeletal Normal                Neurological:  Neurology Normal                                      Endocrine:  Endocrine Normal            Dermatological:  Skin Normal Aldactone   Psych:   anxiety               Physical Exam  General: Cooperative, Alert, Oriented and Well nourished    Airway:  Mallampati: II   Mouth Opening: Normal  TM Distance: Normal  Neck ROM: Normal ROM    Dental:  Intact, Retainer      Anesthesia Plan  Type of Anesthesia, risks & benefits discussed:    Anesthesia Type: Gen Supraglottic Airway  Intra-op Monitoring Plan: Standard ASA Monitors  Post Op Pain Control Plan: multimodal analgesia and IV/PO Opioids PRN  Induction:  IV  Informed Consent: Informed consent signed with the Patient and all parties understand the risks and agree with anesthesia plan.  All questions answered.   ASA Score: 2    Ready For Surgery From Anesthesia Perspective.     .

## 2025-03-18 NOTE — OP NOTE
[facility]  Plastic Surgery  Operative Note    SUMMARY     Date of Procedure: 3/18/2025     Procedure:   Procedure(s) (LRB):  Bilateral capsulotomy with removal of intact partial submuscular saline breast implants  Bilateral capsulorrhaphy  Bilateral secondary breast augmentation using silicone breast implants  Bilateral Wise pattern central mound mastopexy       Surgeons and Role:     * Liu Reddy Jr., MD - Primary    Assistant: SALLIE Caruso    Pre-Operative Diagnosis: Elective procedure for unacceptable cosmetic appearance [Z41.1]    Post-Operative Diagnosis:  Same    Anesthesia: General    Operative Findings (including complications, if any): Bilateral ptotic breast with partial submuscular saline breast implants with lateral and superior migration       Estimated Blood Loss (EBL):  50 mL           Implants:   Implant Name Type Inv. Item Serial No.  Lot No. LRB No. Used Action   [employer] BOOST Breast Implant   [id number]  [id number] Right 1 Implanted   [employer] BOOST Breast Implant      Left 1 Implanted     Implants removed: Right implant 375 cc mentor moderate plus number [id number]  Left breast 375 mentor moderate plus number [id number]    Specimens:   Specimen (24h ago, onward)       Start     Ordered    03/18/25 1245  Specimen to Pathology, Surgery Gross only  Once        Comments: Pre-op Diagnosis: Elective procedure for unacceptable cosmetic appearance [Z41.1]Procedure(s):CAPSULECTOMY, BREAST / WITH IMPLANT REMOVALMAMMOPLASTY, AUGMENTATIONMASTOPEXY / FULL Number of specimens: 2Name of specimens: 1. Right breast implant (GROSS ID ONLY) 2 left breast implant (GROSS ID ONLY)     References:    Click here for ordering Quick Tip   Question Answer Comment   Procedure Type: Gross only    Specimen Class: Routine/Screening    Release to patient Immediate        03/18/25 1246                            Condition: Good    Disposition: PACU - hemodynamically  stable.    Attestation: I was present and scrubbed for the entire procedure.    Indications:  Larissa Pierre is a 34 y.o. female who presented to my clinic with complaints of unacceptable cosmetic appearance of the breast.  The patient underwent a submuscular breast augmentation many years ago using saline breast implants.  She subsequently developed ptosis of the breast tissue and malposition of the implants over time.  On exam she was noted to have soft capsules with lateral and superior migration as well as ptosis of the tissue with very thin parenchyma.  We discussed performing a bilateral capsulotomy with possible capsulorrhaphy versus capsulectomy along with a secondary breast augmentation using silicone breast implants and a bilateral mastopexy.  We discussed the risks benefits alternatives in detail including the possibility of of continued unacceptable cosmetic appearance were the need for additional surgeries or procedures and the patient wished to proceed informed consent was obtained the patient was scheduled for the procedure.    Procedure in detail  After proper identification the patient preoperative area where marks were placed to each breast she was then wheeled to operative room on a hospital stretcher.  She was placed on the operating table in a supine position.  Pressure points were padded and checked this time.  A time-out was called with surgeons nurses and anesthesia agreed upon the patient and procedure.  SCDs were placed to bilateral lower extremities and turned on.  She was then induced under general anesthesia via endotracheal tube.  Once the patient was stabilized an indwelling Duffy catheter was placed into the patient's bladder using sterile technique.  Arms were secured to the arm boards and her chest and breasts were then prepped draped in usual sterile fashion.  First turned my attention to the capsulotomy with implant removal.  An incision was made along the previous scar along  the inframammary fold using a 15 blade scalpel.  It was then carried down through the skin and subcutaneous tissue.  The capsule was then opened horizontally and an intact saline implant was removed from each side.  Next the capsules were inspected and found to be thin without any evidence of capsular contracture however the breast pocket was noted to be very large with lateral and superior lateral migration.  It was then decided to perform popcorn capsulorrhaphy to the lateral aspect to close down the pocket.  This was then reinforced using a 2-0 Vicryl suture in a running fashion.  After the pocket was closed down the pocket was then irrigated with Betadine solution followed by double antibiotic solution.  The implant for each breast was then brought into the field and placed into double antibiotic solution with Betadine mixed.  I then donned new outer gloves for each breast.  Each implant was then placed into the breast pocket using a Win funnel without any contacted external environment.  It was then checked for correct position and the capsule was then closed using a 3-0 Vicryl suture in a running fashion.  The deep dermis was then reapproximated using a 3-0 Vicryl suture in a deep dermal interrupted fashion.  The previously placed wise pattern marks were then double checked and verified at this time there were then tailor tacked together using a 2-0 Vicryl suture and skin staples.  The patient was then placed in the upright position and I then inspected the marks for symmetry between 2 sides the sternal notch to nipple distance was also measured on each side.  After adjusting the marks as needed the patient was then placed back in the supine position and the staples and sutures were then removed.  Each breast was then incised the around the Wise pattern and a 42 mm cookie cutter was used to define the new nipple-areolar complex.  The entire Fajardo pattern was then de-epithelialized using electrocautery.  The  skin was then passed off for discard.  Skin flaps were then elevated with care to leave as many perforators as possible.  After elevation of the skin flaps the breast was then irrigated copious amounts of normal saline and hemostasis was achieved using electrocautery.  Skin flaps were then inset at the areas of tension using a 2-0 Vicryl suture followed by closure of the deep dermis using 3-0 Vicryl suture in a deep dermal interrupted fashion and finally closing the skin using a 4-0 Monocryl in a subcuticular fashion.  The wounds were then cleaned and dried Steri-Strips then applied to each incision.  The nipples were pink with good capillary refill.  Liposuction foam and additional compression was used to lateral chest to prevent lateral migration of the implants to allow for healing of the pocket repair.  The patient was placed in a postoperative bra.  This concluded the procedure the patient tolerated the procedure well without any complications at the end of the case needle and sponge counts were correct x2 and I was present and scrubbed during all aspects of procedure.  She was then awoke from anesthesia and taken to PACU further recovery.

## 2025-03-18 NOTE — OR NURSING
VSS on RA. Pain is tolerable per pt. Bilateral breast incisions with steri strips, ABD pads, and surgical  bra, CDI. PIV CDI. Meets PACU discharge criteria. Ready for transfer to phase II. Family notified.

## 2025-03-18 NOTE — ANESTHESIA POSTPROCEDURE EVALUATION
Anesthesia Post Evaluation    Patient: aLrissa Pierre    Procedure(s) Performed: Procedure(s) (LRB):  CAPSULECTOMY, BREAST / WITH IMPLANT REMOVAL (Bilateral)  MAMMOPLASTY, AUGMENTATION (Bilateral)  MASTOPEXY / FULL (Bilateral)    Final Anesthesia Type: general      Patient location during evaluation: PACU  Patient participation: Yes- Able to Participate  Level of consciousness: awake and alert  Post-procedure vital signs: reviewed and stable  Pain management: adequate  Airway patency: patent    PONV status at discharge: No PONV  Anesthetic complications: no      Cardiovascular status: blood pressure returned to baseline  Respiratory status: unassisted and spontaneous ventilation  Hydration status: euvolemic  Follow-up not needed.              Vitals Value Taken Time   /63 03/18/25 15:16   Temp 36.1 °C (97 °F) 03/18/25 14:30   Pulse 79 03/18/25 15:16   Resp 16 03/18/25 15:00   SpO2 99 % 03/18/25 15:16   Vitals shown include unfiled device data.      No case tracking events are documented in the log.      Pain/Cherelle Score: Pain Rating Prior to Med Admin: 6 (3/18/2025  2:38 PM)  Pain Rating Post Med Admin: 6 (3/18/2025  3:00 PM)  Cherelle Score: 9 (3/18/2025  3:00 PM)

## 2025-03-18 NOTE — TRANSFER OF CARE
"Anesthesia Transfer of Care Note    Patient: Larissa Pierre    Procedure(s) Performed: Procedure(s) (LRB):  CAPSULECTOMY, BREAST / WITH IMPLANT REMOVAL (Bilateral)  MAMMOPLASTY, AUGMENTATION (Bilateral)  MASTOPEXY / FULL (Bilateral)    Patient location: PACU    Anesthesia Type: general    Transport from OR: Transported from OR on 2-3 L/min O2 by NC with adequate spontaneous ventilation    Post pain: adequate analgesia    Post assessment: no apparent anesthetic complications and tolerated procedure well    Post vital signs: stable    Level of consciousness: awake and responds to stimulation    Nausea/Vomiting: no nausea/vomiting    Complications: none    Transfer of care protocol was followed      Last vitals: Visit Vitals  /85 (BP Location: Left arm, Patient Position: Sitting)   Pulse 76   Temp 36.7 °C (98.1 °F) (Oral)   Resp 16   Ht 5' 3" (1.6 m)   Wt 63.5 kg (140 lb)   SpO2 100%   Breastfeeding No   BMI 24.80 kg/m²     "

## 2025-03-19 VITALS
RESPIRATION RATE: 16 BRPM | WEIGHT: 140 LBS | TEMPERATURE: 98 F | HEART RATE: 79 BPM | DIASTOLIC BLOOD PRESSURE: 67 MMHG | SYSTOLIC BLOOD PRESSURE: 108 MMHG | HEIGHT: 63 IN | BODY MASS INDEX: 24.8 KG/M2 | OXYGEN SATURATION: 100 %

## 2025-03-19 LAB
FINAL PATHOLOGIC DIAGNOSIS: NORMAL
GROSS: NORMAL
Lab: NORMAL

## (undated) DEVICE — BANDAGE ROLL COTTN 4.5INX4.1YD

## (undated) DEVICE — DRAPE THREE-QTR REINF 53X77IN

## (undated) DEVICE — SUT VICRYL PLUS 2-0 CT1 18

## (undated) DEVICE — SET BASIN 48X48IN 6000ML RING

## (undated) DEVICE — Device

## (undated) DEVICE — SEAL LENS SCOPE MYOSURE

## (undated) DEVICE — TOWEL OR DISP STRL BLUE 4/PK

## (undated) DEVICE — SUT MONOCRYL PLUS 4-0 P3

## (undated) DEVICE — DEVICE ABLATION NOVASURE DISP

## (undated) DEVICE — TIP YANKAUERS BULB NO VENT

## (undated) DEVICE — BRA CLASSIC COMFORT BLK SZ 34

## (undated) DEVICE — SOL PVP-I SCRUB 7.5% 4OZ

## (undated) DEVICE — EVACUATOR PENCIL SMOKE NEPTUNE

## (undated) DEVICE — ELECTRODE REM PLYHSV RETURN 9

## (undated) DEVICE — SUT VICRYL 2-0 36 CT-1

## (undated) DEVICE — BAG DRAIN ANTI REFLUX 2000ML

## (undated) DEVICE — PAD ABDOMINAL STERILE 8X10IN

## (undated) DEVICE — SOL POVIDONE SCRUB IODINE 4 OZ

## (undated) DEVICE — ELECTRODE BLADE INSULATED 1 IN

## (undated) DEVICE — CLOSURE SKIN STERI STRIP 1/2X4

## (undated) DEVICE — SEE L#120831

## (undated) DEVICE — GLOVE SENSICARE PI GRN 7

## (undated) DEVICE — BANDAGE ESMARK ELASTIC ST 4X9

## (undated) DEVICE — SUT MCRYL PLUS 4-0 PS2 27IN

## (undated) DEVICE — UNDERGLOVES BIOGEL PI SIZE 8

## (undated) DEVICE — FUNNEL KELLER STERILE

## (undated) DEVICE — BLADE ELECTRO EDGE INSULATED

## (undated) DEVICE — ELECTRODE BLD EXT INSUL 1

## (undated) DEVICE — BLADE SURG STAINLESS STEEL #15

## (undated) DEVICE — POSITIONER HEEL FOAM CONVOLTD

## (undated) DEVICE — TRAY CATH 1-LYR URIMTR 16FR

## (undated) DEVICE — SOL IRR NACL .9% 3000ML

## (undated) DEVICE — VIDEO RENTAL SERVICE

## (undated) DEVICE — DRAPE STERI INSTRUMENT 1018

## (undated) DEVICE — DRESSING TRANS 2X2 TEGADERM

## (undated) DEVICE — BLADE SURG STAINLESS STEEL #10

## (undated) DEVICE — ELECTRODE NEEDLE 1IN

## (undated) DEVICE — UNDERGLOVE BIOGEL PI SZ 6.5 LF

## (undated) DEVICE — STAPLER SKIN ROTATING HEAD

## (undated) DEVICE — PACK FLUENT DISPOSABLE

## (undated) DEVICE — SOL IRR SOD CHL .9% POUR

## (undated) DEVICE — SUT VICRYL PLUS 3-0 SH 18IN

## (undated) DEVICE — SPONGE LAP 18X18 PREWASHED

## (undated) DEVICE — SYR 10CC LUER LOCK

## (undated) DEVICE — GLOVE BIOGEL SKINSENSE PI 6.5

## (undated) DEVICE — GLOVE BIOGEL SKINSENSE PI 7.5

## (undated) DEVICE — GLOVE SENSICARE PI MICRO 7

## (undated) DEVICE — SYR 30CC LUER LOCK

## (undated) DEVICE — SCRUB 10% POVIDONE IODINE 4OZ